# Patient Record
Sex: MALE | Race: WHITE | NOT HISPANIC OR LATINO | Employment: FULL TIME | URBAN - METROPOLITAN AREA
[De-identification: names, ages, dates, MRNs, and addresses within clinical notes are randomized per-mention and may not be internally consistent; named-entity substitution may affect disease eponyms.]

---

## 2019-05-18 ENCOUNTER — DOCUMENTATION (OUTPATIENT)
Dept: NEUROLOGY | Facility: CLINIC | Age: 64
End: 2019-05-18

## 2019-05-22 ENCOUNTER — TELEPHONE (OUTPATIENT)
Dept: NEUROLOGY | Facility: CLINIC | Age: 64
End: 2019-05-22

## 2019-06-12 ENCOUNTER — TELEPHONE (OUTPATIENT)
Dept: NEUROLOGY | Facility: CLINIC | Age: 64
End: 2019-06-12

## 2019-06-13 ENCOUNTER — TELEPHONE (OUTPATIENT)
Dept: NEUROLOGY | Facility: CLINIC | Age: 64
End: 2019-06-13

## 2019-06-13 ENCOUNTER — OFFICE VISIT (OUTPATIENT)
Dept: NEUROLOGY | Facility: CLINIC | Age: 64
End: 2019-06-13
Payer: COMMERCIAL

## 2019-06-13 VITALS
HEIGHT: 73 IN | DIASTOLIC BLOOD PRESSURE: 76 MMHG | HEART RATE: 63 BPM | SYSTOLIC BLOOD PRESSURE: 120 MMHG | WEIGHT: 272.1 LBS | BODY MASS INDEX: 36.06 KG/M2

## 2019-06-13 DIAGNOSIS — Z99.89 OSA ON CPAP: Primary | ICD-10-CM

## 2019-06-13 DIAGNOSIS — Z86.73 HISTORY OF STROKE: ICD-10-CM

## 2019-06-13 DIAGNOSIS — F48.2 PSEUDOBULBAR AFFECT: ICD-10-CM

## 2019-06-13 DIAGNOSIS — E78.5 HYPERLIPIDEMIA, UNSPECIFIED HYPERLIPIDEMIA TYPE: ICD-10-CM

## 2019-06-13 DIAGNOSIS — G47.33 OSA ON CPAP: Primary | ICD-10-CM

## 2019-06-13 PROBLEM — R06.09 DYSPNEA ON EXERTION: Status: ACTIVE | Noted: 2019-02-25

## 2019-06-13 PROBLEM — I25.10 ATHEROSCLEROSIS OF NATIVE CORONARY ARTERY OF NATIVE HEART WITHOUT ANGINA PECTORIS: Status: ACTIVE | Noted: 2019-02-25

## 2019-06-13 PROBLEM — C61 PROSTATE CANCER (HCC): Status: ACTIVE | Noted: 2017-08-28

## 2019-06-13 PROBLEM — R06.00 DYSPNEA ON EXERTION: Status: ACTIVE | Noted: 2019-02-25

## 2019-06-13 PROCEDURE — 99215 OFFICE O/P EST HI 40 MIN: CPT | Performed by: PSYCHIATRY & NEUROLOGY

## 2019-06-13 RX ORDER — ISOSORBIDE DINITRATE 30 MG/1
30 TABLET ORAL DAILY
COMMUNITY

## 2019-06-13 RX ORDER — PNV NO.95/FERROUS FUM/FOLIC AC 28MG-0.8MG
2 TABLET ORAL DAILY
Status: ON HOLD | COMMUNITY
End: 2021-02-10 | Stop reason: ALTCHOICE

## 2019-06-13 RX ORDER — METOPROLOL SUCCINATE 100 MG/1
100 TABLET, EXTENDED RELEASE ORAL DAILY
COMMUNITY

## 2019-06-13 RX ORDER — FLUTICASONE FUROATE, UMECLIDINIUM BROMIDE AND VILANTEROL TRIFENATATE 100; 62.5; 25 UG/1; UG/1; UG/1
1 POWDER RESPIRATORY (INHALATION) DAILY
Refills: 11 | COMMUNITY
Start: 2019-05-05

## 2019-06-13 RX ORDER — GABAPENTIN 300 MG/1
300 CAPSULE ORAL
Refills: 2 | COMMUNITY
Start: 2019-06-10 | End: 2019-08-09

## 2019-06-13 RX ORDER — LISINOPRIL 20 MG/1
20 TABLET ORAL
Refills: 3 | COMMUNITY
Start: 2019-05-14

## 2019-06-13 RX ORDER — MONTELUKAST SODIUM 4 MG/1
1 TABLET, CHEWABLE ORAL 2 TIMES DAILY
COMMUNITY

## 2019-06-13 RX ORDER — TICAGRELOR 60 MG/1
60 TABLET ORAL 2 TIMES DAILY
Refills: 3 | COMMUNITY
Start: 2019-05-20 | End: 2019-07-17

## 2019-06-13 RX ORDER — OMEPRAZOLE 40 MG/1
40 CAPSULE, DELAYED RELEASE ORAL DAILY
COMMUNITY
Start: 2018-07-11

## 2019-06-13 RX ORDER — EZETIMIBE 10 MG/1
10 TABLET ORAL DAILY
COMMUNITY

## 2019-07-10 ENCOUNTER — TELEPHONE (OUTPATIENT)
Dept: NEUROLOGY | Facility: CLINIC | Age: 64
End: 2019-07-10

## 2019-07-10 NOTE — TELEPHONE ENCOUNTER
Fax received from patient of Long-Term Disability forms  Current STD runs out July 31, 2019  Per patient, previous STD forms stated patient "is unable to return to work from a neurological standpoint"  Patient has been out of work since January 29, 2019  Please let us know if you would be agreeable to completing Long term forms  If agreeable we will contact patient for payment  Reviewed with patient fee for forms and 2 week turnaround  Blank forms to be scanned under media

## 2019-07-11 NOTE — TELEPHONE ENCOUNTER
Yes, the plan would be to re-valuate this at the time of his next follow up  If he feels ready to return, however, I am not opposed to it  Please will you remind me of what kind of work he does and what symptoms he is now currently experiencing that are preventing him from returning

## 2019-07-12 ENCOUNTER — TELEPHONE (OUTPATIENT)
Dept: NEUROLOGY | Facility: CLINIC | Age: 64
End: 2019-07-12

## 2019-07-12 NOTE — TELEPHONE ENCOUNTER
Kait Adler from [facility] states the patient is complaining of side effects from Brilinta including increased heart rate, heart pounding, and headache  Dr Rosy Espinoza would like to switch the patient to plavix if Dr Dhara Valladares is agreeable to this  Please advise  If agreeable, Kait Adler is requesting a call back as well as documentation  They will accept encounter printed and faxed       F: [phone]  P: [phone] ext [phone]

## 2019-07-12 NOTE — TELEPHONE ENCOUNTER
Patient's wife calling in regarding below  She states the patient is not feeling well and they are not sure what to do  She reports the patient has SOB, rapid heart beat, chest tightness, and a severe headache  She states she is concerned about him having another stroke  Asking if this is a medication side effect or if it could be something else  Advised her to take the patient to the ED to be assessed  Patient's wife will take the patient to [facility]  Please advise on medications below  Thanks

## 2019-07-16 NOTE — TELEPHONE ENCOUNTER
Patient was admitted to hospital over the weekend, see note from [address] dated 7/12  He states he is feeling a lot better, his BP and HR are normal again  Patient was put back on plavix and is continuing to take aspirin  They determined Jeraline Porch was causing his symptoms  They also discontinued his prilosec as it interacted with plavix and he was started on protonix   Lisinopril dose was lowered to 2 5mg daily because he was hypotensive in the hospital

## 2019-07-16 NOTE — TELEPHONE ENCOUNTER
Called patient gave form completion payment fee of $25 over the phone  patient is aware of the 14 day turn around period he also states he needs forms completed by 7/31/19  Also on page 12 of the 3 pages  The provider will have to review that page which those are Functional  Capacity questions  Patient request that forms be faxed to [phone]

## 2019-07-16 NOTE — TELEPHONE ENCOUNTER
I do agree with evaluating him in the ER  If there is no other cause found for his symptoms and it is felt to be related to [place] we can go back to [place] + Plavix for now

## 2019-07-16 NOTE — TELEPHONE ENCOUNTER
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

## 2019-07-17 DIAGNOSIS — Z86.73 HISTORY OF STROKE: Primary | ICD-10-CM

## 2019-07-17 RX ORDER — CLOPIDOGREL BISULFATE 75 MG/1
75 TABLET ORAL DAILY
Qty: 30 TABLET | Refills: 0
Start: 2019-07-17

## 2019-07-17 NOTE — TELEPHONE ENCOUNTER
Noted   Suggest that we check P2Y12 level in 2 weeks to ensure that he is a responder to this medication  Please help him to schedule this (it has to be scheduled or drawn at [facility] I think   )  This will help to ensure that he is appropriately responding to the Plavix  If he is not responding well to the Plavix (if it is ineffective) then we may need to consider an alternative    Rx entered

## 2019-07-17 NOTE — TELEPHONE ENCOUNTER
Also, Please will you check with him to see if he needs a new rx for the Plavix or if he has a reasonable supply from the hospital?  (I entered a 30 day rx but did not sent it to the pharmacy   )

## 2019-07-18 NOTE — TELEPHONE ENCOUNTER
Called patient, read him Dr Amie Saldivar message  He verbalizes understanding on all information  States he is located near [facility] and will be going there to have his labs drawn  Requested paper script be mailed to him  Script printed and mailed to patient  He will call with info after drawn in 2 weeks  Patient states he has 3 refills of his Plavix at a 30 day supply each fill  Patient does not have any additional questions at this time

## 2019-07-22 NOTE — TELEPHONE ENCOUNTER
Form completed and emailed to you  Please sign,  Office note will need to be faxed along with signed form

## 2019-07-23 NOTE — TELEPHONE ENCOUNTER
Signed form received from dr Corrinne Parson  Form and last office noted faxed to [phone]  Copy of form mailed to pt      Completed form given to  to scan into chart

## 2019-07-25 NOTE — TELEPHONE ENCOUNTER
Jozef charles/ [facility] stated Dr Nickolas Massey is requesting something in writing that Dr Massiel Mcgrath is agreeable to switching to plavix    Fax to: [phone]

## 2019-07-25 NOTE — TELEPHONE ENCOUNTER
Received another call from [facility], she stated she just learned that the patient will no longer be coming to their office  Patient will now be seeing [facility]  Letter no longer needed from Dr Duglas Caballero regarding plavix

## 2019-08-09 DIAGNOSIS — Z86.73 HISTORY OF STROKE: Primary | ICD-10-CM

## 2019-08-09 RX ORDER — GABAPENTIN 600 MG/1
600 TABLET ORAL
Qty: 30 TABLET | Refills: 3 | Status: SHIPPED | OUTPATIENT
Start: 2019-08-09 | End: 2019-12-06 | Stop reason: SDUPTHER

## 2019-08-09 RX ORDER — GABAPENTIN 300 MG/1
300 CAPSULE ORAL
Qty: 90 CAPSULE | Refills: 0 | OUTPATIENT
Start: 2019-08-09 | End: 2019-11-07

## 2019-08-09 NOTE — TELEPHONE ENCOUNTER
Please give him a call  I entered a new rx for him for GBP 600mg to be taken at night  I started with a 30 day supply but if he does well at this dose and wants to continue I can re-enter for a 90 day supply  He should let us know

## 2019-08-09 NOTE — TELEPHONE ENCOUNTER
Medication refill check list    Correct patient? yes   Correct medication name, dose, and pill size? yes   Correct provider? yes   Last and Next appt  scheduled? Yes, last date 06/13/19 & next date 09/27/19   Right pharmacy listed? yes   Correct quantity for 30 or 90 days? yes   Is the patient out of refills? When was it last prescribed? Yes, date 06/10/19   Directions match what the patient says they are taking? yes   Enough refills? (none for controlled substances, 1 year for routine medications) yes       Patient would like an increase on the strength  He is still having trouble sleeping at night

## 2019-09-10 ENCOUNTER — TELEPHONE (OUTPATIENT)
Dept: NEUROLOGY | Facility: CLINIC | Age: 64
End: 2019-09-10

## 2019-09-10 NOTE — TELEPHONE ENCOUNTER
Per Governor Ashley @ [place] called back and stated they do not do this test and would advise patient to use [facility]

## 2019-09-17 NOTE — TELEPHONE ENCOUNTER
This is a specialized test  It is not done by [facility] or [address] that I know of  We do it at [facility] and [facility] does it at [facility]   The labs cannot be drawn elsewhere and shipped to the location to run it as it throws off the result  If he cannot have it drawn, then we will have to do our best for him without it

## 2019-09-27 ENCOUNTER — OFFICE VISIT (OUTPATIENT)
Dept: NEUROLOGY | Facility: CLINIC | Age: 64
End: 2019-09-27
Payer: COMMERCIAL

## 2019-09-27 VITALS
HEIGHT: 73 IN | SYSTOLIC BLOOD PRESSURE: 130 MMHG | HEART RATE: 54 BPM | BODY MASS INDEX: 36.82 KG/M2 | WEIGHT: 277.8 LBS | DIASTOLIC BLOOD PRESSURE: 74 MMHG

## 2019-09-27 DIAGNOSIS — G47.09 OTHER INSOMNIA: ICD-10-CM

## 2019-09-27 DIAGNOSIS — Z86.73 HISTORY OF STROKE: Primary | ICD-10-CM

## 2019-09-27 DIAGNOSIS — G47.33 OSA ON CPAP: ICD-10-CM

## 2019-09-27 DIAGNOSIS — Z99.89 OSA ON CPAP: ICD-10-CM

## 2019-09-27 DIAGNOSIS — F48.2 PSEUDOBULBAR AFFECT: ICD-10-CM

## 2019-09-27 DIAGNOSIS — I65.23 BILATERAL CAROTID ARTERY STENOSIS: ICD-10-CM

## 2019-09-27 DIAGNOSIS — E78.5 HYPERLIPIDEMIA, UNSPECIFIED HYPERLIPIDEMIA TYPE: ICD-10-CM

## 2019-09-27 DIAGNOSIS — I69.391 DYSPHAGIA AS LATE EFFECT OF CEREBROVASCULAR ACCIDENT (CVA): ICD-10-CM

## 2019-09-27 PROCEDURE — 99214 OFFICE O/P EST MOD 30 MIN: CPT | Performed by: PSYCHIATRY & NEUROLOGY

## 2019-09-27 RX ORDER — NITROGLYCERIN 0.4 MG/1
TABLET SUBLINGUAL AS NEEDED
Refills: 0 | Status: ON HOLD | COMMUNITY
Start: 2019-07-13 | End: 2021-02-10 | Stop reason: ALTCHOICE

## 2019-09-27 RX ORDER — TRAZODONE HYDROCHLORIDE 50 MG/1
TABLET ORAL
Qty: 30 TABLET | Refills: 5 | Status: SHIPPED | OUTPATIENT
Start: 2019-09-27 | End: 2020-03-13

## 2019-09-27 RX ORDER — PHENOL 1.4 %
2 AEROSOL, SPRAY (ML) MUCOUS MEMBRANE
COMMUNITY

## 2019-09-27 RX ORDER — PANTOPRAZOLE SODIUM 40 MG/1
40 TABLET, DELAYED RELEASE ORAL DAILY
COMMUNITY

## 2019-09-27 RX ORDER — METOPROLOL SUCCINATE 25 MG/1
25 TABLET, EXTENDED RELEASE ORAL DAILY
COMMUNITY

## 2019-09-27 NOTE — PATIENT INSTRUCTIONS
Stroke: Giacomo Varela presents for a follow-up evaluation with regard to his prior stroke  In the interval since his last visit to the office they report no new symptoms concerning for recurrent TIA or stroke  He is getting some headaches from time to time, and still has some residual word-finding issues but he is no longer having outbursts of emotion that are not congruent with his mood  He notes that he is having a little bit of emotional lability from time to time which is not unexpected  He has recently had a test for pulmonary hypertension which apparently looked quite good, and is pending a transesophageal echocardiogram   He has an implanted loop monitor in place and we have no report of any atrial fibrillation up to this point in time  He is taking his medication as prescribed and denies any significant bleeding or bruising issues  He continues to experience significant insomnia and is currently experiencing dysphagia as a late effect from his stroke  - for ongoing stroke prevention he should continue his combination of anti-platelet medications, statin, repatha, and appropriate blood pressure and glycemic control  -  I would suggest that he does not need dual anti-platelet therapy long-term for stroke prevention  His recent P2Y12 test done at [facility] confirms that he is responding to Plavix  At the discretion of the cardiologist he could discontinue either aspirin or Plavix  -I would like him to be physically active in as much as he feels capable of doing so  -I will defer to his primary care/ cardiology team for monitoring of his cholesterol panel and blood sugar numbers  Should target a blood pressure of less than 130/80 on a regular basis  - we will look back at the original records from [facility] to ensure that there is no evidence whatsoever of carotid artery stenosis that would require ongoing monitoring  Our office will give him a call  In this regard      - he did not drive any significant benefit from the increase in gabapentin  At this point we will maintain gabapentin and begin trazodone at a dose of 25 mg at night for 3 days  If he notes no benefit at 25 mg he will increase to 50 mg   I would like him to contact our office in no more than 10-14 days to report on his progress  If he is doing well on the trazodone then we will wean the gabapentin off   -with regard to his dysphagia, he reports that it is more significant to solids than to liquids  He has not had any pneumonia  We will ask for a barium swallow to be performed  I will plan for him to return to the office to see us in 8 months time but I would be happy to see him sooner if the need should arise  If he has any symptoms concerning for TIA or stroke such as sudden painless loss of vision or double vision, difficulty speaking or swallowing, vertigo/ room spinning that does not quickly resolve, or weakness/ numbness affecting 1 side of the face or body he should proceed by ambulance to the nearest emergency room immediately

## 2019-09-27 NOTE — PROGRESS NOTES
Patient ID: Jarrod Blanca is a 60 y o  male  Assessment/Plan:    No problem-specific Assessment & Plan notes found for this encounter  Diagnoses and all orders for this visit:    History of stroke  -     VAS carotid complete study; Future    YOLA on CPAP    Hyperlipidemia, unspecified hyperlipidemia type    Pseudobulbar affect    Dysphagia as late effect of cerebrovascular accident (CVA)  -     FL barium swallow video w speech; Future    Other insomnia  -     traZODone (DESYREL) 50 mg tablet; 1/2 tab HS X 3 days, if no significant benefit increase to 1 tab HS    Bilateral carotid artery stenosis  -     VAS carotid complete study; Future    Other orders  -     metoprolol succinate (TOPROL-XL) 25 mg 24 hr tablet; Take 25 mg by mouth daily  -     pantoprazole (PROTONIX) 40 mg tablet; Take 40 mg by mouth daily  -     Melatonin 10 MG TABS; Take 2 tablets by mouth daily at bedtime  -     nitroglycerin (NITROSTAT) 0 4 mg SL tablet; as needed       Patient Instructions   Stroke: Giacomo Varela presents for a follow-up evaluation with regard to his prior stroke  In the interval since his last visit to the office they report no new symptoms concerning for recurrent TIA or stroke  He is getting some headaches from time to time, and still has some residual word-finding issues but he is no longer having outbursts of emotion that are not congruent with his mood  He notes that he is having a little bit of emotional lability from time to time which is not unexpected  He has recently had a test for pulmonary hypertension which apparently looked quite good, and is pending a transesophageal echocardiogram   He has an implanted loop monitor in place and we have no report of any atrial fibrillation up to this point in time  He is taking his medication as prescribed and denies any significant bleeding or bruising issues    He continues to experience significant insomnia and is currently experiencing dysphagia as a late effect from his stroke  - for ongoing stroke prevention he should continue his combination of anti-platelet medications, statin, repatha, and appropriate blood pressure and glycemic control  -  I would suggest that he does not need dual anti-platelet therapy long-term for stroke prevention  His recent P2Y12 test done at [facility] confirms that he is responding to Plavix  At the discretion of the cardiologist he could discontinue either aspirin or Plavix  -I would like him to be physically active in as much as he feels capable of doing so  -I will defer to his primary care/ cardiology team for monitoring of his cholesterol panel and blood sugar numbers  Should target a blood pressure of less than 130/80 on a regular basis  - we will look back at the original records from [facility] to ensure that there is no evidence whatsoever of carotid artery stenosis that would require ongoing monitoring  Our office will give him a call  In this regard  - he did not drive any significant benefit from the increase in gabapentin  At this point we will maintain gabapentin and begin trazodone at a dose of 25 mg at night for 3 days  If he notes no benefit at 25 mg he will increase to 50 mg   I would like him to contact our office in no more than 10-14 days to report on his progress  If he is doing well on the trazodone then we will wean the gabapentin off   -with regard to his dysphagia, he reports that it is more significant to solids than to liquids  He has not had any pneumonia  We will ask for a barium swallow to be performed  I will plan for him to return to the office to see us in 8 months time but I would be happy to see him sooner if the need should arise    If he has any symptoms concerning for TIA or stroke such as sudden painless loss of vision or double vision, difficulty speaking or swallowing, vertigo/ room spinning that does not quickly resolve, or weakness/ numbness affecting 1 side of the face or body he should proceed by ambulance to the nearest emergency room immediately  Subjective:      HPI:  Sarah Burrell presents for follow-up with regard to his prior stroke  In the interval since his last visit the office he reports no new symptoms concerning for recurrent TIA or stroke  He takes his medications as prescribed  He did not endorse any severe bleeding or bruising issues  He notes that he previously had been taken off of the colestipol but had a re-emergence of significant diarrhea with bowel incontinence, and is now back on it and doing much better  He also reports that the repatha  Has significantly improved his cholesterol numbers  He continues to have issues with insomnia  The increase in gabapentin from 300 mg 600 mg did not provide any additional benefit  He is not experiencing any side effects  He denies ongoing issues with pseudobulbar affect but reports that he is somewhat emotionally labile and make prior get emotional more easily  He does have obstructive sleep apnea and continues to use his CPAP machine  He is currently pending a transesophageal echocardiogram, and is working with his cardiologist with regard to monitoring for atrial fibrillation  Addendum:  Subsequent to his visit in the office I was able to look back in the records at the CT angiography that was performed at [facility]   There was evidence of bilateral relatively mild carotid artery stenosis  This was in May of 2019  Considering the plaque in his carotid arteries I do think that this should be repeated in approximately 3 months time  Prescription was placed in the system and the patient and his wife were given the information prior to their departure          Past Medical History:   Diagnosis Date    Arthritis     COPD [facility]chronic obstructive [facility] disease) ([facility]     GERD (gastroesophageal reflux disease)     Heart attack ([facility]     Hypertension     Lung disease     [facility] ([facility]     Sleep apnea     Stroke ([facility] )     TIA (transient ischemic attack)        Social History     Socioeconomic History    Marital status: /Civil Union     Spouse name: None    Number of children: None    Years of education: None    Highest education level: None   Occupational History    None   Social Needs    Financial resource strain: None    Food insecurity:     Worry: None     Inability: None    Transportation needs:     Medical: None     Non-medical: None   Tobacco Use    Smoking status: Former Smoker     Last attempt to quit: 2018     Years since quittin 3    Smokeless tobacco: Never Used   Substance and Sexual Activity    Alcohol use: Not Currently     Comment: rarely    Drug use: Never    Sexual activity: None   Lifestyle    Physical activity:     Days per week: None     Minutes per session: None    Stress: None   Relationships    Social connections:     Talks on phone: None     Gets together: None     Attends Yazdanism service: None     Active member of club or organization: None     Attends meetings of clubs or organizations: None     Relationship status: None    Intimate partner violence:     Fear of current or ex partner: None     Emotionally abused: None     Physically abused: None     Forced sexual activity: None   Other Topics Concern    None   Social History Narrative    None       Family History   Problem Relation Age of Onset    Stroke Mother     Atrial fibrillation Mother     No Known Problems Father     Diabetes Maternal Grandmother     Heart disease Maternal Grandmother     Heart disease Maternal Grandfather          Current Outpatient Medications:     aspirin 81 MG tablet, Take 81 mg by mouth daily, Disp: , Rfl:     clopidogrel (PLAVIX) 75 mg tablet, Take 1 tablet (75 mg total) by mouth daily, Disp: 30 tablet, Rfl: 0    colestipol (COLESTID) 1 g tablet, Take 1 tablet by mouth 2 (two) times a day, Disp: , Rfl:     Evolocumab (Adrianna Byers [place]), Inject under the skin Once every 2 weeks, Disp: , Rfl:     ezetimibe (ZETIA) 10 mg tablet, Take 10 mg by mouth daily, Disp: , Rfl:     gabapentin (NEURONTIN) 600 MG tablet, Take 1 tablet (600 mg total) by mouth daily at bedtime, Disp: 30 tablet, Rfl: 3    isosorbide dinitrate (ISORDIL) 30 mg tablet, Take 30 mg by mouth daily, Disp: , Rfl:     lisinopril (ZESTRIL) 20 mg tablet, Take 20 mg by mouth daily at bedtime, Disp: , Rfl: 3    Melatonin 10 MG TABS, Take 2 tablets by mouth daily at bedtime, Disp: , Rfl:     metoprolol succinate (TOPROL XL) 100 mg 24 hr tablet, Take 100 mg by mouth daily, Disp: , Rfl:     metoprolol succinate (TOPROL-XL) 25 mg 24 hr tablet, Take 25 mg by mouth daily, Disp: , Rfl:     Omega-3 Fatty Acids (FISH OIL OMEGA-3) 1000 MG CAPS, Take 2 g by mouth daily, Disp: , Rfl:     pantoprazole (PROTONIX) 40 mg tablet, Take 40 mg by mouth daily, Disp: , Rfl:     TRELEGY ELLIPTA 100-62 5-25 MCG/INH inhaler, 1 puff daily, Disp: , Rfl: 11    nitroglycerin (NITROSTAT) 0 4 mg SL tablet, as needed, Disp: , Rfl: 0    omeprazole (PriLOSEC) 40 MG capsule, Take 40 mg by mouth daily, Disp: , Rfl:           The following portions of the patient's history were reviewed: allergies, current medications, past family history, past medical history, past social history and problem list            Objective:    Blood pressure 130/74, pulse (!) 54, height 6' 1" (1 854 m), weight 126 kg (277 lb 12 8 oz)  Physical Exam    Neurological Exam      At the time of my evaluation he was awake, alert, and in no distress  There were no obvious cranial neuropathies or lateralizing sides  There was no dysarthria or aphasia  His gait was stable with no clear ataxia  ROS:    Review of Systems   Constitutional: Negative  Negative for appetite change and fever  HENT: Positive for trouble swallowing  Negative for hearing loss, tinnitus and voice change           Sinus problems   Eyes: Negative  Negative for photophobia and pain  Respiratory: Positive for cough and shortness of breath  Cardiovascular: Negative  Negative for palpitations  Gastrointestinal: Negative  Negative for nausea and vomiting  Loss of bowel control   Endocrine: Negative  Negative for cold intolerance and heat intolerance  Erection difficulties  Loss of sexual drive   Genitourinary: Negative  Negative for dysuria, frequency and urgency  Musculoskeletal: Positive for gait problem (pain while walking)  Negative for myalgias and neck pain  Skin: Negative  Negative for rash  Neurological: Positive for headaches  Negative for dizziness, tremors, seizures, syncope, facial asymmetry, speech difficulty, weakness, light-headedness and numbness  Cramping   Hematological: Negative  Does not bruise/bleed easily  Psychiatric/Behavioral: Positive for sleep disturbance (trouble falling asleep, waking up at night)  Negative for confusion and hallucinations  Reviewed ROS as entered by medical assistant

## 2019-10-23 ENCOUNTER — HOSPITAL ENCOUNTER (OUTPATIENT)
Dept: RADIOLOGY | Facility: HOSPITAL | Age: 64
Discharge: HOME | End: 2019-10-23
Attending: PSYCHIATRY & NEUROLOGY
Payer: COMMERCIAL

## 2019-10-23 DIAGNOSIS — I69.391 DYSPHAGIA AS LATE EFFECT OF CEREBROVASCULAR ACCIDENT (CVA): ICD-10-CM

## 2019-10-23 PROCEDURE — 92611 MOTION FLUOROSCOPY/SWALLOW: CPT

## 2019-10-23 PROCEDURE — G8997 SWALLOW GOAL STATUS: HCPCS

## 2019-10-23 PROCEDURE — 74230 X-RAY XM SWLNG FUNCJ C+: CPT

## 2019-10-23 PROCEDURE — G8996 SWALLOW CURRENT STATUS: HCPCS

## 2019-10-23 PROCEDURE — G8998 SWALLOW D/C STATUS: HCPCS

## 2019-10-23 NOTE — PROCEDURES
Video Swallow Study      Patient Name: Lottie Krishna  OHMJG'M Date: 10/23/2019        Past Medical History  Past Medical History:   Diagnosis Date    Arthritis     [facility]     GERD (gastroesophageal reflux disease)     [facility] attack ([facility] )     Hypertension     Lung disease     Prostate cancer ([facility] )     Pseudobulbar affect 6/13/2019    Sleep apnea     Stroke ([facility] )     TIA (transient ischemic attack)         Past Surgical History  Past Surgical History:   Procedure Laterality Date    CAROTID STENT      CHOLECYSTECTOMY      HERNIA REPAIR         Video Barium Swallow Study    Summary:  Pt presents with oral phase of the swallow that is WNL, and minimal impairment of the pharyngeal phase  This is characterized by mildly reduced bolus control and premature spill of thin liquid into the pyriform sinuses with consecutive sips by cup and straw, and reduced hyolaryngeal elevation, which does not appear to impact the swallow adversely  There was no aspiration/penetration observed  Pt is appropriate to continue current regular diet/thin liquids with recommendations below  Images are on PACS for review  Recommendations:  Diet: Regular diet  Liquids: Thin liquids, cup or straw  Meds: Whole with water  Strategies: Eat slowly, small bites, single sips, alternate solids and liquids if pt feels solid food is "stuck"  Upright position  F/u ST tx: No  Therapy Prognosis: Excellent  Prognosis considerations: Age, cognitive status  Independent with meals  Results reviewed with: pt, sent to physician through [facility]  Recommendations were discussed with patient; he verbalized understanding  If a dedicated assessment of the esophagus is desired, consider esophagram/barium swallow or EGD  Patient's goal:  Wants to make sure his swallow is normal, wants to know why he feels like his throat "fills up" when drinking liquids quickly  Goals:  Pt will tolerate least restrictive diet w/out s/s aspiration or oral/pharyngeal difficulties  Pt is a 59 y/o male seen today for OP VBS  Pt has a hx of TIA and "mild CVA", per patient  Pt c/o not being able to "guzzle" or drink thin liquids quickly, states that the throat "feels like it fills up"  He also reports intermittent solid dysphagia, feeling like food "gets stuck", pointing to throat  He does not identify any particular foods that cause this  Pt expresses some anxiety that his mother  of esophageal CA, and is hoping that his esophagus is normal  Explained to patient while we do a brief scan of the esophagus during the VBS, for full assessment of the esophagus an esophagram and/or EGD would be necessary  He verbalized understanding  PMH:  See above, includes TIA/CVA    Previous VBS:  No    Current Diet:   Regular, thin liquids    Premorbid diet:  Same    Dentition:    O2 requirement:  Room air    Oral mech:  Strength and ROM: Grossly intact, no droop or weakness    Vocal Quality/Speech:  WNL, no dysarthria/apraxia, vocal quality is strong, WNL    Cognitive status:  Appears intact during conversation, able to follow all commands and answer questions appropriately  Consistencies administered: Barium laden applesauce, banana, bagel, hard cookie, thin liquids, 13mm barium pill  Liquids were administered by cup and straw  Pt was seated laterally at 90 degrees  Oral stage:  WNL  Lip closure: WNL  Mastication: efficient, rotary motion  Bolus formation: cohesive, prompt  Bolus control: good overall, somewhat reduced with thin liquids  Transfer: Prompt  Residue: Minimal to none, clears with second swallow which patient initiates  Pharyngeal stage:  Minimal impairment  Swallow promptness: Mostly prompt  Spill to valleculae: Thin liquids  Spill to pyriforms:   Thing liquids, with consecutive sips; liquid spilled to pyriforms and filled the entire pyriform space prior to the UES opening up; with single sips this did not occur  Epiglottic inversion: WNL  Laryngeal excursion: Reduced elevation and anterior motion  Pharyngeal constriction: WNL  Vallecular retention: No  Pyriform retention: No  PPW coating: No  Osteophytes: No  CP prominence: No  Retropulsion from prominence: N/A  Transient penetration: No  Epiglottic undercoat: No  Penetration: No  Aspiration: No  Strategies: Single sips reduced premature spill into pharynx, and resulted in better bolus control and more prompt swallows of thin liquid  Response to aspiration: N/A    Screening of Esophageal stage:  A brief scan of the esophagus did not reveal any obvious abnormality in bolus clearance  If further assessment of the esophagus is desired, recommend esophagram or EGD

## 2019-10-25 ENCOUNTER — TELEPHONE (OUTPATIENT)
Dept: NEUROLOGY | Facility: CLINIC | Age: 64
End: 2019-10-25

## 2019-10-25 NOTE — PROGRESS NOTES
Please call Colin Brothers and let him know the results of the video barium swallow and their recommendations below  Thanks!       Summary:  Pt presents with oral phase of the swallow that is WNL, and minimal impairment of the pharyngeal phase  This is characterized by mildly reduced bolus control and premature spill of thin liquid into the pyriform sinuses with consecutive sips by cup and straw, and reduced hyolaryngeal elevation, which does not appear to impact the swallow adversely  There was no aspiration/penetration observed  Pt is appropriate to continue current regular diet/thin liquids with recommendations below  Images are on PACS for review             Recommendations:  Diet: Regular diet  Liquids: Thin liquids, cup or straw  Meds: Whole with water  Strategies: Eat slowly, small bites, single sips, alternate solids and liquids if pt feels solid food is "stuck"  Upright position  F/u ST tx: No  Therapy Prognosis: Excellent  Prognosis considerations: Age, cognitive status  Independent with meals  Results reviewed with: pt, sent [facility] physician [facility]  Recommendations were discussed with patient; he verbalized understanding  If a dedicated assessment of the esophagus is desired, consider esophagram/barium swallow or EGD

## 2019-10-25 NOTE — TELEPHONE ENCOUNTER
Spoke to patient's wife (patient just stepped out)  She stated someone did go over the results with him and provided recommendations  I also reviewed recommendations with wife  She verbalized understanding  She requested a copy of the swallow study be faxed to PCP  Faxed to Dr Randolph Briones office @ [phone]        Filed: 10/25/2019  8:51 AM Date of Service: 10/23/2019 11:59 PM Status: Signed  : Sonia Mercer MD (Physician)    Added by:  Sonia Mercer MD      Please call Carola Mckoy and let him know the results of the video barium swallow and their recommendations below    Thanks!

## 2019-12-06 DIAGNOSIS — Z86.73 HISTORY OF STROKE: ICD-10-CM

## 2019-12-06 RX ORDER — GABAPENTIN 600 MG/1
TABLET ORAL
Qty: 30 TABLET | Refills: 3 | Status: SHIPPED | OUTPATIENT
Start: 2019-12-06 | End: 2020-03-20

## 2020-02-24 ENCOUNTER — TELEPHONE (OUTPATIENT)
Dept: NEUROLOGY | Facility: CLINIC | Age: 65
End: 2020-02-24

## 2020-02-24 NOTE — TELEPHONE ENCOUNTER
Patient's wife dropped off FMLA form to be updated  Dropped charges and collected payment of $15 for form completion  Form scanned into chart  Please fax completed form to [employer] at [phone]  Patient would also like a copy mailed to home address on file

## 2020-03-02 NOTE — TELEPHONE ENCOUNTER
Forms partially completed  Emailed forms to [place] for Dr Vinod Sumner to complete and sign  Once signed, pls fax to [phone] and scan into chart          thanks

## 2020-03-04 NOTE — TELEPHONE ENCOUNTER
Forms emailed to Dr Hyacinth Rinne  Pls complete/review and sign  Pls email it back to me        thanks

## 2020-03-05 NOTE — TELEPHONE ENCOUNTER
Forms were emailed to Dr Carl Perez at the end of the day yesterday  Awaiting completed forms from Dr Carl Perez

## 2020-03-06 NOTE — TELEPHONE ENCOUNTER
Received forms from Dr Good Zarate, faxed to number provided  Scanned into patient's chart  Mailing the patient a copy as requested

## 2020-03-12 DIAGNOSIS — G47.09 OTHER INSOMNIA: ICD-10-CM

## 2020-03-12 NOTE — TELEPHONE ENCOUNTER
I got this refill request for [facility]  We need to know how he is doing on the medication and if he is taking 1/2 tablet or 1 full tablet  Is it effective for him? I can then process the refill with the correct strength and the number of tablets

## 2020-03-13 ENCOUNTER — TELEPHONE (OUTPATIENT)
Dept: NEUROLOGY | Facility: CLINIC | Age: 65
End: 2020-03-13

## 2020-03-13 RX ORDER — TRAZODONE HYDROCHLORIDE 50 MG/1
TABLET ORAL
Qty: 45 TABLET | Refills: 1 | Status: SHIPPED | OUTPATIENT
Start: 2020-03-13 | End: 2020-05-12

## 2020-03-13 NOTE — TELEPHONE ENCOUNTER
----- Message from Too Johnson MD sent at 3/13/2020 11:45 AM EDT -----  Regarding: rx    Please see below    At this point, the dose is still reasonably low and provided he is not having any side effects ( adverse effects on mood, low blood pressure/dizziness, suicidal thoughts /confusion    Not that I expect him to have any thing like that, but just want to double-check), he can take either 1 full tablet or 1 5 tablets for the next 2 weeks  If he is having night that he can tell are going to be difficult he can certainly take the 1 5 tablets  If he finds this strategy to be effective that is excellent but if not he can to 1 5 tablets every night  If there are no side effects whatsoever and he is still having some difficult nights he can take the dose up to 2 tablets when he feels that is necessary beginning 2 weeks from now  I would like for him to contact us in no more than 3-4 weeks to report on his progress and so that we can send the proper refills down the road    "Luis BOUDREAUX routed conversation to You 3 hours ago (8:32 AM)    JONATAN BOUDREAUX 3 hours ago (8:32 AM)          As per pt he is taking the 1 full tablet  He also advises that sometimes it work and sometimes it doesn't  He stated last night he was not able to sleep  Pt also advises that if dose can be increased it might help  Documentation     Luis BOUDREAUX 3 hours ago (8:27 AM)    Rico Weldon RN routed conversation to [facility] [place] [facility] 18 hours ago (5:28 PM)    [facility]  Neurology [address] (11:21 AM)          I got this refill request for [facility]  We need to know how he is doing on the medication and if he is taking 1/2 tablet or 1 full tablet  Is it effective for him? I can then process the refill with the correct strength and the number of tablets        Documentation     Interface, [facility] routed conversation to You Yesterday (8:00 AM)"

## 2020-03-13 NOTE — TELEPHONE ENCOUNTER
Informed patient of instructions below  Verbalized understanding  Will update office in a few weeks on progress  Currently taking 1 tablet and having no side effects

## 2020-03-13 NOTE — TELEPHONE ENCOUNTER
As per pt he is taking the 1 full tablet  He also advises that sometimes it work and sometimes it doesn't  He stated last night he was not able to sleep  Pt also advises that if dose can be increased it might help

## 2020-03-16 ENCOUNTER — TELEPHONE (OUTPATIENT)
Dept: NEUROLOGY | Facility: CLINIC | Age: 65
End: 2020-03-16

## 2020-03-16 NOTE — TELEPHONE ENCOUNTER
Received Fax on 3/16/2020 from [facility] [phone] requesting Medical Records 12/1/2019 to present with signed [facility]  Scanned documents into chart and faxed to [phone] on 3/16/2020

## 2020-03-20 DIAGNOSIS — Z86.73 HISTORY OF STROKE: ICD-10-CM

## 2020-03-20 RX ORDER — GABAPENTIN 600 MG/1
TABLET ORAL
Qty: 30 TABLET | Refills: 3 | Status: ON HOLD | OUTPATIENT
Start: 2020-03-20 | End: 2021-02-10 | Stop reason: ALTCHOICE

## 2020-03-30 ENCOUNTER — TELEPHONE (OUTPATIENT)
Dept: NEUROLOGY | Facility: CLINIC | Age: 65
End: 2020-03-30

## 2020-03-30 NOTE — TELEPHONE ENCOUNTER
Patient calling in  He states that he has increased his Trazadone 50mg tablets, now taking 1 5 tablets  He states that he thinks thing are going very well on the 75mg (total dose)  Monique Valencia with staying on this dose  Additionally, he is wondering if he should continue taking the gabapentin 600mg caps, 1 cap at bedtime  He states that he saw online somewhere that this can cause shortness of breath and he is concerned because he has COPD and sleep apnea       Please advise    [phone]  Or  [phone]  Monique Valencia to leave a detailed message on both

## 2020-03-30 NOTE — TELEPHONE ENCOUNTER
Okay, we will plan for him to continue the trazodone at the 75 mg dose  The plan is indeed to wean him off of the gabapentin  He can start by decreasing to 1/2 tablet which will be 300 mg for 2 weeks and then discontinue  If he notices that the trazodone is less effective only marginally effective at the 300 mg of gabapentin eating go back up to 600 mg  I would not expected to cause any significant shortness of breath for him at this point in time if it has not already done so  Typically, I would not expect gabapentin to directly cause shortness of breath although it could potentially contribute to some fluid retention  If he notices that he has increased fluid retention in the legs or elsewhere it may be reasonable to decrease at that point, but we are planning to wean it off for the time being anyway

## 2020-05-12 DIAGNOSIS — G47.09 OTHER INSOMNIA: ICD-10-CM

## 2020-05-12 RX ORDER — TRAZODONE HYDROCHLORIDE 50 MG/1
75 TABLET ORAL
Qty: 45 TABLET | Refills: 3 | Status: SHIPPED | OUTPATIENT
Start: 2020-05-12 | End: 2020-08-31

## 2020-05-20 ENCOUNTER — TELEPHONE (OUTPATIENT)
Dept: NEUROLOGY | Facility: CLINIC | Age: 65
End: 2020-05-20

## 2020-06-05 ENCOUNTER — TELEPHONE (OUTPATIENT)
Dept: NEUROLOGY | Facility: CLINIC | Age: 65
End: 2020-06-05

## 2020-08-26 DIAGNOSIS — G47.09 OTHER INSOMNIA: ICD-10-CM

## 2020-08-27 NOTE — TELEPHONE ENCOUNTER
Called patient  He is taking Trazadone 50mg tabs, 2 tabs HS and he is sleeping very well on this dose  He states he was taking 1 5 tabs but still wasn't sleeping well so he increased to 2 tabs  Patient states he does have a side effect of dry mouth  He notes that his tongue feels slightly tingly and feels that he cannot handle hot foods as well as he used to

## 2020-08-31 RX ORDER — TRAZODONE HYDROCHLORIDE 50 MG/1
100 TABLET ORAL
Qty: 60 TABLET | Refills: 2 | Status: SHIPPED | OUTPATIENT
Start: 2020-08-31 | End: 2020-12-31 | Stop reason: SDUPTHER

## 2020-11-23 ENCOUNTER — TELEPHONE (OUTPATIENT)
Dept: NEUROLOGY | Facility: CLINIC | Age: 65
End: 2020-11-23

## 2020-11-23 ENCOUNTER — OFFICE VISIT (OUTPATIENT)
Dept: NEUROLOGY | Facility: CLINIC | Age: 65
End: 2020-11-23
Payer: COMMERCIAL

## 2020-11-23 VITALS — HEIGHT: 73 IN | BODY MASS INDEX: 33.8 KG/M2 | WEIGHT: 255 LBS

## 2020-11-23 DIAGNOSIS — E78.5 HYPERLIPIDEMIA, UNSPECIFIED HYPERLIPIDEMIA TYPE: ICD-10-CM

## 2020-11-23 DIAGNOSIS — G62.9 NEUROPATHY: ICD-10-CM

## 2020-11-23 DIAGNOSIS — Z86.73 HISTORY OF STROKE: Primary | ICD-10-CM

## 2020-11-23 DIAGNOSIS — G47.33 OSA ON CPAP: ICD-10-CM

## 2020-11-23 DIAGNOSIS — Z99.89 OSA ON CPAP: ICD-10-CM

## 2020-11-23 DIAGNOSIS — I69.391 DYSPHAGIA AS LATE EFFECT OF CEREBROVASCULAR ACCIDENT (CVA): ICD-10-CM

## 2020-11-23 PROCEDURE — 99442 PR PHYS/QHP TELEPHONE EVALUATION 11-20 MIN: CPT | Performed by: PSYCHIATRY & NEUROLOGY

## 2020-12-10 ENCOUNTER — TELEPHONE (OUTPATIENT)
Dept: NEUROLOGY | Facility: CLINIC | Age: 65
End: 2020-12-10

## 2020-12-22 ENCOUNTER — TELEPHONE (OUTPATIENT)
Dept: NEUROLOGY | Facility: CLINIC | Age: 65
End: 2020-12-22

## 2020-12-31 DIAGNOSIS — G47.09 OTHER INSOMNIA: ICD-10-CM

## 2020-12-31 RX ORDER — TRAZODONE HYDROCHLORIDE 50 MG/1
100 TABLET ORAL
Qty: 60 TABLET | Refills: 4 | Status: SHIPPED | OUTPATIENT
Start: 2020-12-31 | End: 2021-04-28 | Stop reason: SDUPTHER

## 2021-01-11 ENCOUNTER — TELEPHONE (OUTPATIENT)
Dept: NEUROLOGY | Facility: CLINIC | Age: 66
End: 2021-01-11

## 2021-01-11 NOTE — TELEPHONE ENCOUNTER
Pt called and states that Dr Contreras Payton ordered bw few months ago  He would like to go to [address] in [place]  Asking if all bw can be done at that facility  [facility] ([phone] in [place]) [phone], spoke w/ Cici Jane and states that ALIS screen w/ reflex must be done at  [facility] in [place]  Pt made aware  States that he will get all bw done in [facility] instead since this is closer to his house

## 2021-01-13 ENCOUNTER — TRANSCRIBE ORDERS (OUTPATIENT)
Dept: ADMINISTRATIVE | Facility: HOSPITAL | Age: 66
End: 2021-01-13

## 2021-01-13 DIAGNOSIS — R10.32 ABDOMINAL PAIN, LEFT LOWER QUADRANT: Primary | ICD-10-CM

## 2021-01-14 ENCOUNTER — HOSPITAL ENCOUNTER (OUTPATIENT)
Dept: CT IMAGING | Facility: HOSPITAL | Age: 66
Discharge: HOME | End: 2021-01-14
Payer: COMMERCIAL

## 2021-01-14 DIAGNOSIS — R10.32 ABDOMINAL PAIN, LEFT LOWER QUADRANT: ICD-10-CM

## 2021-01-14 PROCEDURE — G1004 CDSM NDSC: HCPCS

## 2021-01-14 PROCEDURE — 74176 CT ABD & PELVIS W/O CONTRAST: CPT

## 2021-02-05 LAB — HBA1C MFR BLD HPLC: 6.2 %

## 2021-02-10 ENCOUNTER — APPOINTMENT (EMERGENCY)
Dept: CT IMAGING | Facility: HOSPITAL | Age: 66
DRG: 392 | End: 2021-02-10
Payer: COMMERCIAL

## 2021-02-10 ENCOUNTER — HOSPITAL ENCOUNTER (INPATIENT)
Facility: HOSPITAL | Age: 66
LOS: 4 days | Discharge: HOME | DRG: 392 | End: 2021-02-14
Attending: EMERGENCY MEDICINE | Admitting: SURGERY
Payer: COMMERCIAL

## 2021-02-10 DIAGNOSIS — K63.1 BOWEL PERFORATION (HCC): ICD-10-CM

## 2021-02-10 DIAGNOSIS — K76.89 HEPATIC CYST: ICD-10-CM

## 2021-02-10 DIAGNOSIS — K57.92 ACUTE DIVERTICULITIS OF INTESTINE: ICD-10-CM

## 2021-02-10 DIAGNOSIS — R10.9 ABDOMINAL PAIN: Primary | ICD-10-CM

## 2021-02-10 PROBLEM — K21.9 GASTRO-ESOPHAGEAL REFLUX DISEASE WITHOUT ESOPHAGITIS: Status: ACTIVE | Noted: 2021-02-10

## 2021-02-10 PROBLEM — J44.9 CHRONIC OBSTRUCTIVE PULMONARY DISEASE (HCC): Status: ACTIVE | Noted: 2019-07-12

## 2021-02-10 PROBLEM — I10 ESSENTIAL HYPERTENSION: Status: ACTIVE | Noted: 2019-07-12

## 2021-02-10 LAB
ALBUMIN SERPL BCP-MCNC: 3.6 G/DL (ref 3.5–5)
ALP SERPL-CCNC: 75 U/L (ref 46–116)
ALT SERPL W P-5'-P-CCNC: 31 U/L (ref 12–78)
ANION GAP SERPL CALCULATED.3IONS-SCNC: 7 MMOL/L (ref 4–13)
AST SERPL W P-5'-P-CCNC: 16 U/L (ref 5–45)
BASOPHILS # BLD AUTO: 0.05 THOUSANDS/ΜL (ref 0–0.1)
BASOPHILS NFR BLD AUTO: 0 % (ref 0–1)
BILIRUB SERPL-MCNC: 0.8 MG/DL (ref 0.2–1)
BUN SERPL-MCNC: 15 MG/DL (ref 5–25)
CALCIUM SERPL-MCNC: 8.7 MG/DL (ref 8.3–10.1)
CHLORIDE SERPL-SCNC: 101 MMOL/L (ref 100–108)
CO2 SERPL-SCNC: 29 MMOL/L (ref 21–32)
CREAT SERPL-MCNC: 0.98 MG/DL (ref 0.6–1.3)
EOSINOPHIL # BLD AUTO: 0.05 THOUSAND/ΜL (ref 0–0.61)
EOSINOPHIL NFR BLD AUTO: 0 % (ref 0–6)
ERYTHROCYTE [DISTWIDTH] IN BLOOD BY AUTOMATED COUNT: 13.5 % (ref 11.6–15.1)
GFR SERPL CREATININE-BSD FRML MDRD: 82 ML/MIN/1.73SQ M
GLUCOSE SERPL-MCNC: 104 MG/DL (ref 65–140)
HCT VFR BLD AUTO: 49.7 % (ref 36.5–49.3)
HGB BLD-MCNC: 15.9 G/DL (ref 12–17)
IMM GRANULOCYTES # BLD AUTO: 0.05 THOUSAND/UL (ref 0–0.2)
IMM GRANULOCYTES NFR BLD AUTO: 0 % (ref 0–2)
LIPASE SERPL-CCNC: 97 U/L (ref 73–393)
LYMPHOCYTES # BLD AUTO: 1 THOUSANDS/ΜL (ref 0.6–4.47)
LYMPHOCYTES NFR BLD AUTO: 8 % (ref 14–44)
MCH RBC QN AUTO: 28.8 PG (ref 26.8–34.3)
MCHC RBC AUTO-ENTMCNC: 32 G/DL (ref 31.4–37.4)
MCV RBC AUTO: 90 FL (ref 82–98)
MONOCYTES # BLD AUTO: 1.06 THOUSAND/ΜL (ref 0.17–1.22)
MONOCYTES NFR BLD AUTO: 8 % (ref 4–12)
NEUTROPHILS # BLD AUTO: 10.36 THOUSANDS/ΜL (ref 1.85–7.62)
NEUTS SEG NFR BLD AUTO: 84 % (ref 43–75)
NRBC BLD AUTO-RTO: 0 /100 WBCS
PLATELET # BLD AUTO: 219 THOUSANDS/UL (ref 149–390)
PMV BLD AUTO: 9.9 FL (ref 8.9–12.7)
POTASSIUM SERPL-SCNC: 4 MMOL/L (ref 3.5–5.3)
PROT SERPL-MCNC: 8 G/DL (ref 6.4–8.2)
RBC # BLD AUTO: 5.53 MILLION/UL (ref 3.88–5.62)
SODIUM SERPL-SCNC: 137 MMOL/L (ref 136–145)
WBC # BLD AUTO: 12.57 THOUSAND/UL (ref 4.31–10.16)

## 2021-02-10 PROCEDURE — 80053 COMPREHEN METABOLIC PANEL: CPT | Performed by: PHYSICIAN ASSISTANT

## 2021-02-10 PROCEDURE — 85025 COMPLETE CBC W/AUTO DIFF WBC: CPT | Performed by: PHYSICIAN ASSISTANT

## 2021-02-10 PROCEDURE — 83690 ASSAY OF LIPASE: CPT | Performed by: PHYSICIAN ASSISTANT

## 2021-02-10 PROCEDURE — 74177 CT ABD & PELVIS W/CONTRAST: CPT

## 2021-02-10 PROCEDURE — 96375 TX/PRO/DX INJ NEW DRUG ADDON: CPT

## 2021-02-10 PROCEDURE — 99285 EMERGENCY DEPT VISIT HI MDM: CPT | Performed by: PHYSICIAN ASSISTANT

## 2021-02-10 PROCEDURE — 99285 EMERGENCY DEPT VISIT HI MDM: CPT

## 2021-02-10 PROCEDURE — G1004 CDSM NDSC: HCPCS

## 2021-02-10 PROCEDURE — 99242 OFF/OP CONSLTJ NEW/EST SF 20: CPT | Performed by: INTERNAL MEDICINE

## 2021-02-10 PROCEDURE — 99222 1ST HOSP IP/OBS MODERATE 55: CPT | Performed by: SURGERY

## 2021-02-10 PROCEDURE — 36415 COLL VENOUS BLD VENIPUNCTURE: CPT | Performed by: PHYSICIAN ASSISTANT

## 2021-02-10 PROCEDURE — 96361 HYDRATE IV INFUSION ADD-ON: CPT

## 2021-02-10 PROCEDURE — 96374 THER/PROPH/DIAG INJ IV PUSH: CPT

## 2021-02-10 RX ORDER — ACETAMINOPHEN 325 MG/1
650 TABLET ORAL EVERY 6 HOURS PRN
Status: DISCONTINUED | OUTPATIENT
Start: 2021-02-10 | End: 2021-02-14 | Stop reason: HOSPADM

## 2021-02-10 RX ORDER — ASPIRIN 81 MG/1
81 TABLET ORAL DAILY
Status: DISCONTINUED | OUTPATIENT
Start: 2021-02-11 | End: 2021-02-14 | Stop reason: HOSPADM

## 2021-02-10 RX ORDER — SODIUM CHLORIDE 9 MG/ML
125 INJECTION, SOLUTION INTRAVENOUS CONTINUOUS
Status: DISCONTINUED | OUTPATIENT
Start: 2021-02-10 | End: 2021-02-10

## 2021-02-10 RX ORDER — TRAZODONE HYDROCHLORIDE 50 MG/1
100 TABLET ORAL
Status: DISCONTINUED | OUTPATIENT
Start: 2021-02-10 | End: 2021-02-14 | Stop reason: HOSPADM

## 2021-02-10 RX ORDER — ONDANSETRON 2 MG/ML
4 INJECTION INTRAMUSCULAR; INTRAVENOUS ONCE
Status: COMPLETED | OUTPATIENT
Start: 2021-02-10 | End: 2021-02-10

## 2021-02-10 RX ORDER — HYDROMORPHONE HCL/PF 1 MG/ML
1 SYRINGE (ML) INJECTION
Status: DISCONTINUED | OUTPATIENT
Start: 2021-02-10 | End: 2021-02-14 | Stop reason: HOSPADM

## 2021-02-10 RX ORDER — MORPHINE SULFATE 4 MG/ML
4 INJECTION, SOLUTION INTRAMUSCULAR; INTRAVENOUS ONCE
Status: COMPLETED | OUTPATIENT
Start: 2021-02-10 | End: 2021-02-10

## 2021-02-10 RX ORDER — LANOLIN ALCOHOL/MO/W.PET/CERES
6 CREAM (GRAM) TOPICAL
Status: DISCONTINUED | OUTPATIENT
Start: 2021-02-10 | End: 2021-02-14 | Stop reason: HOSPADM

## 2021-02-10 RX ORDER — SODIUM CHLORIDE 9 MG/ML
125 INJECTION, SOLUTION INTRAVENOUS CONTINUOUS
Status: DISCONTINUED | OUTPATIENT
Start: 2021-02-10 | End: 2021-02-12

## 2021-02-10 RX ORDER — HEPARIN SODIUM 5000 [USP'U]/ML
5000 INJECTION, SOLUTION INTRAVENOUS; SUBCUTANEOUS EVERY 8 HOURS SCHEDULED
Status: DISCONTINUED | OUTPATIENT
Start: 2021-02-10 | End: 2021-02-13

## 2021-02-10 RX ORDER — ONDANSETRON 2 MG/ML
4 INJECTION INTRAMUSCULAR; INTRAVENOUS EVERY 6 HOURS PRN
Status: DISCONTINUED | OUTPATIENT
Start: 2021-02-10 | End: 2021-02-14 | Stop reason: HOSPADM

## 2021-02-10 RX ADMIN — ACETAMINOPHEN 650 MG: 325 TABLET, FILM COATED ORAL at 20:17

## 2021-02-10 RX ADMIN — TRAZODONE HYDROCHLORIDE 100 MG: 100 TABLET ORAL at 23:51

## 2021-02-10 RX ADMIN — PIPERACILLIN AND TAZOBACTAM 3.38 G: 3; .375 INJECTION, POWDER, LYOPHILIZED, FOR SOLUTION INTRAVENOUS at 16:50

## 2021-02-10 RX ADMIN — SODIUM CHLORIDE 1000 ML: 0.9 INJECTION, SOLUTION INTRAVENOUS at 14:48

## 2021-02-10 RX ADMIN — MELATONIN 6 MG: at 23:51

## 2021-02-10 RX ADMIN — SODIUM CHLORIDE 125 ML/HR: 0.9 INJECTION, SOLUTION INTRAVENOUS at 21:32

## 2021-02-10 RX ADMIN — HEPARIN SODIUM 5000 UNITS: 5000 INJECTION INTRAVENOUS; SUBCUTANEOUS at 17:27

## 2021-02-10 RX ADMIN — METRONIDAZOLE 500 MG: 500 INJECTION, SOLUTION INTRAVENOUS at 17:29

## 2021-02-10 RX ADMIN — ONDANSETRON 4 MG: 2 INJECTION INTRAMUSCULAR; INTRAVENOUS at 15:27

## 2021-02-10 RX ADMIN — HEPARIN SODIUM 5000 UNITS: 5000 INJECTION INTRAVENOUS; SUBCUTANEOUS at 21:27

## 2021-02-10 RX ADMIN — MORPHINE SULFATE 4 MG: 4 INJECTION INTRAVENOUS at 15:27

## 2021-02-10 RX ADMIN — SODIUM CHLORIDE, SODIUM LACTATE, POTASSIUM CHLORIDE, AND CALCIUM CHLORIDE 1000 ML: .6; .31; .03; .02 INJECTION, SOLUTION INTRAVENOUS at 19:16

## 2021-02-10 RX ADMIN — IOHEXOL 100 ML: 350 INJECTION, SOLUTION INTRAVENOUS at 15:42

## 2021-02-10 RX ADMIN — SODIUM CHLORIDE 125 ML/HR: 0.9 INJECTION, SOLUTION INTRAVENOUS at 16:50

## 2021-02-10 NOTE — H&P
H&P Exam - General Surgery   Lottie Krishna 63 y o  male MRN: [id number]  Unit/Bed#: ED 12 Encounter: [id number]    Assessment/Plan     Assessment:  Acute complicated sigmoid diverticulitis  CAD  Obesity  COPD  Plan:  Acute complicated sigmoid diverticulitis - npo, ivf, iv abx serial exams  COPD - cont current [place] mgt  Obesity - weight loss diet exercise discussed increased surgical morbidity associated with his obesity  CAD - cont asa hold plavix for now, cont med mgt  Incisional hernia - discussed his multiple incisional hernia and he should consider repair in the future although given complex medical and surgical history we will address this as an outpt  History of Present Illness     HPI:  Lottie Krishna is a 63 y o  male who presents with RLQ pain   Pain started yesterday with some back pain but progressed to RLQ worsening throughout the day currently at 7/10 worse with movement better staying still  + diarrhea no nausea or vomting no fevers or chills no sick contacts or travel    Review of Systems    Historical Information   Past Medical History:   Diagnosis Date    Arthritis     [facility]     GERD (gastroesophageal reflux disease)     [facility]     Hypertension     Lung disease     [facility]     Pseudobulbar affect 2019    Sleep apnea     [facility]     TIA (transient ischemic attack)      Past Surgical History:   Procedure Laterality Date    CAROTID STENT      CHOLECYSTECTOMY      HERNIA REPAIR       Social History   Social History     Substance and Sexual Activity   Alcohol Use Not Currently    Comment: rarely     Social History     Substance and Sexual Activity   Drug Use Never     Social History     Tobacco Use   Smoking Status Former Smoker    Quit date: 2018    Years since quittin 7   Smokeless Tobacco Never Used     E-Cigarette/Vaping     E-Cigarette/Vaping Substances     Family History:   Family History Problem Relation Age of Onset    Stroke Mother     Atrial fibrillation Mother     No Known Problems Father     Diabetes Maternal Grandmother     Heart disease Maternal Grandmother     Heart disease Maternal Grandfather        Meds/Allergies   all medications and allergies reviewed  Allergies   Allergen Reactions    Metoprolol Anaphylaxis, Itching and Other (See Comments)     Heart activity  At the ER 20 years ago he was given this medication and pt says "everyone came running and all the bells were going off" but he does not recall the reaction  bradycardia?, has been taking toprol  Patient unsure of allergy reaction      Brilinta [Ticagrelor]     Bee Venom Rash and Hives       Objective   First Vitals:   Blood Pressure: 146/78 (02/10/21 1420)  Pulse: 75 (02/10/21 1420)  Temperature: 97 5 °F (36 4 °C) (02/10/21 1420)  Temp Source: Tympanic (02/10/21 1420)  Respirations: 18 (02/10/21 1420)  Height: 6' 1" (185 4 cm) (02/10/21 1420)  Weight - Scale: 116 kg (255 lb 11 7 oz) (02/10/21 1420)  SpO2: 96 % (02/10/21 1420)    Current Vitals:   Blood Pressure: (!) 187/77 (02/10/21 1430)  Pulse: 72 (02/10/21 1430)  Temperature: 97 5 °F (36 4 °C) (02/10/21 1420)  Temp Source: Tympanic (02/10/21 1420)  Respirations: 20 (02/10/21 1430)  Height: 6' 1" (185 4 cm) (02/10/21 1420)  Weight - Scale: 116 kg (255 lb 11 7 oz) (02/10/21 1420)  SpO2: 97 % (02/10/21 1430)      Intake/Output Summary (Last 24 hours) at 2/10/2021 1654  Last data filed at 2/10/2021 1628  Gross per 24 hour   Intake 1000 ml   Output --   Net 1000 ml       Invasive Devices     Peripheral Intravenous Line            Peripheral IV 02/10/21 Right Antecubital less than 1 day                Physical Exam  Vitals signs and nursing note reviewed  Constitutional:       Appearance: He is well-developed  HENT:      Head: Normocephalic and atraumatic  Eyes:      Conjunctiva/sclera: Conjunctivae normal    Neck:      Musculoskeletal: Neck supple  Cardiovascular:      Rate and Rhythm: Normal rate and regular rhythm  Heart sounds: No murmur  Pulmonary:      Effort: Pulmonary effort is normal  No respiratory distress  Breath sounds: Normal breath sounds  Abdominal:      General: There is no distension  Palpations: Abdomen is soft  There is no mass  Tenderness: There is abdominal tenderness  There is rebound  There is no guarding  Hernia: A hernia is present  Comments: Obese mild TTP in R lower abdomen just off midline   Skin:     General: Skin is warm and dry  Neurological:      Mental Status: He is alert  Lab Results:   I have personally reviewed pertinent lab results  , CBC:   Lab Results   Component Value Date    WBC 12 57 (H) 02/10/2021    HGB 15 9 02/10/2021    HCT 49 7 (H) 02/10/2021    MCV 90 02/10/2021     02/10/2021    MCH 28 8 02/10/2021    MCHC 32 0 02/10/2021    RDW 13 5 02/10/2021    MPV 9 9 02/10/2021    NRBC 0 02/10/2021   , CMP:   Lab Results   Component Value Date    SODIUM 137 02/10/2021    K 4 0 02/10/2021     02/10/2021    CO2 29 02/10/2021    BUN 15 02/10/2021    CREATININE 0 98 02/10/2021    CALCIUM 8 7 02/10/2021    AST 16 02/10/2021    ALT 31 02/10/2021    ALKPHOS 75 02/10/2021    EGFR 82 02/10/2021   , Coagulation: No results found for: PT, INR, APTT, Urinalysis: No results found for: Kathleen Christopher, SPECGRAV, PHUR, LEUKOCYTESUR, NITRITE, PROTEINUA, GLUCOSEU, KETONESU, BILIRUBINUR, BLOODU, Amylase: No results found for: AMYLASE, Lipase:   Lab Results   Component Value Date    LIPASE 97 02/10/2021     Imaging: I have personally reviewed pertinent films in PACS  EKG, Pathology, and Other Studies: I have personally reviewed pertinent reports        Code Status: Level 1 - Full Code  Advance Directive and Living Will:      Power of :    POLST:

## 2021-02-10 NOTE — ASSESSMENT & PLAN NOTE
Present on admission, a/eb abdominal pain, ct scan findings in the setting of recurrenti diverticulitis history  CT scan abdomen/pelvis - Findings consistent with acute, perforated sigmoid colon diverticulitis in the right lower abdomen  No evidence for pericolonic abscess   Multiple fat-containing ventral abdominal wall hernias seen on CT scan  Plan per surgery - is conservative mgt for now  NPO  IVFS  IV antibiotics  Pain control

## 2021-02-10 NOTE — ED PROVIDER NOTES
History  Chief Complaint   Patient presents with    Abdominal Pain     Patient presents to ED with RLQ pain that now radiates to middle of his stomach and radiates around to the middle of his back  Patient reports waking up with the pain this morning , only having diarrhea today and small amount of urine  Patient states he has recently had diverticulitis      Patient is a 62 y/o M with h/o COPD, diverticulitis, TIA, HTN with abdominal pain that started last night  He states he started with right lower back pain last night and now has RLQ abdominal pain  Movement makes the pain worse, nothing makes it better  He did not take anything for pain  No fevers, chills  He states when the pain gets bad he feels nauseated  No vomiting  He had an episode of diarrhea this morning  No blood in stool  He states he has had decreased urination  Patient had diverticulitis last month, but his symptoms resolved  History provided by:  Patient  Abdominal Pain  Pain location:  RLQ  Pain quality: sharp and stabbing    Pain radiates to:  Does not radiate  Pain severity:  Moderate  Onset quality:  Sudden  Duration:  2 days  Timing:  Intermittent  Progression:  Unchanged  Chronicity:  New  Context: not sick contacts, not suspicious food intake and not trauma    Relieved by:  Nothing  Worsened by: Movement  Ineffective treatments:  None tried  Associated symptoms: diarrhea and nausea    Associated symptoms: no chest pain, no chills, no constipation, no cough, no dysuria, no fever, no shortness of breath and no vomiting    Risk factors: obesity    Risk factors: not elderly        Prior to Admission Medications   Prescriptions Last Dose Informant Patient Reported? Taking?    Evolocumab (REPATHA SURECLICK SC)   Yes No   Sig: Inject under the skin Once every 2 weeks   Melatonin 10 MG TABS   Yes No   Sig: Take 2 tablets by mouth daily at bedtime   Omega-3 Fatty Acids (FISH OIL OMEGA-3) 1000 MG CAPS   Yes No   Sig: Take 2 g by mouth daily   TRELEGY ELLIPTA 100-62 5-25 MCG/INH inhaler   Yes No   Si puff daily   aspirin 81 MG tablet   Yes No   Sig: Take 81 mg by mouth daily   clopidogrel (PLAVIX) 75 mg tablet   No No   Sig: Take 1 tablet (75 mg total) by mouth daily   colestipol (COLESTID) 1 g tablet   Yes No   Sig: Take 1 tablet by mouth 2 (two) times a day   ezetimibe (ZETIA) 10 mg tablet   Yes No   Sig: Take 10 mg by mouth daily   gabapentin (NEURONTIN) 600 MG tablet   No No   Sig: TAKE ONE TABLET BY MOUTH at bedtime   Patient not taking: Reported on 2020   isosorbide dinitrate (ISORDIL) 30 mg tablet   Yes No   Sig: Take 30 mg by mouth daily   lisinopril (ZESTRIL) 20 mg tablet   Yes No   Sig: Take 20 mg by mouth daily at bedtime   metoprolol succinate (TOPROL XL) 100 mg 24 hr tablet   Yes No   Sig: Take 100 mg by mouth daily   metoprolol succinate (TOPROL-XL) 25 mg 24 hr tablet   Yes No   Sig: Take 25 mg by mouth daily   nitroglycerin (NITROSTAT) 0 4 mg SL tablet   Yes No   Sig: as needed   omeprazole (PriLOSEC) 40 MG capsule   Yes No   Sig: Take 40 mg by mouth daily   pantoprazole (PROTONIX) 40 mg tablet   Yes No   Sig: Take 40 mg by mouth daily   traZODone (DESYREL) 50 mg tablet   No No   Sig: Take 2 tablets (100 mg total) by mouth daily at bedtime      Facility-Administered Medications: None       Past Medical History:   Diagnosis Date    Arthritis     COPD (chronic obstructive pulmonary disease) (HCC)     GERD (gastroesophageal reflux disease)     [facility] )     Hypertension     Lung disease     Prostate cancer ([facility] )     Pseudobulbar affect 2019    Sleep apnea     Stroke (HCC)     TIA (transient ischemic attack)        Past Surgical History:   Procedure Laterality Date    CAROTID STENT      CHOLECYSTECTOMY      HERNIA REPAIR         Family History   Problem Relation Age of Onset    Stroke Mother     Atrial fibrillation Mother     No Known Problems Father     Diabetes Maternal Grandmother     Heart disease Maternal Grandmother     Heart disease Maternal Grandfather      I have reviewed and agree with the history as documented  E-Cigarette/Vaping     E-Cigarette/Vaping Substances     Social History     Tobacco Use    Smoking status: Former Smoker     Quit date: 2018     Years since quittin 7    Smokeless tobacco: Never Used   Substance Use Topics    Alcohol use: Not Currently     Comment: rarely    Drug use: Never       Review of Systems   Constitutional: Negative for chills and fever  HENT: Negative  Respiratory: Negative for cough and shortness of breath  Cardiovascular: Negative for chest pain and leg swelling  Gastrointestinal: Positive for abdominal pain, diarrhea and nausea  Negative for blood in stool, constipation and vomiting  Genitourinary: Positive for decreased urine volume  Negative for dysuria and frequency  Musculoskeletal: Positive for back pain  Negative for neck pain  Skin: Negative for color change and rash  Neurological: Negative for dizziness and numbness  Psychiatric/Behavioral: Negative for confusion  All other systems reviewed and are negative  Physical Exam  Physical Exam  Vitals signs and nursing note reviewed  Constitutional:       General: He is in acute distress  Appearance: Normal appearance  He is well-developed and well-groomed  He is obese  He is not ill-appearing  HENT:      Head: Normocephalic and atraumatic  Eyes:      Conjunctiva/sclera: Conjunctivae normal    Neck:      Musculoskeletal: Normal range of motion  Cardiovascular:      Rate and Rhythm: Normal rate and regular rhythm  Heart sounds: Normal heart sounds  Pulmonary:      Effort: Pulmonary effort is normal       Breath sounds: No wheezing, rhonchi or rales  Abdominal:      General: Abdomen is flat  Bowel sounds are normal       Tenderness: There is abdominal tenderness in the right lower quadrant  There is guarding  There is no rebound   Positive signs include Rovsing's sign, McBurney's sign and obturator sign  Negative signs include psoas sign  Hernia: A hernia is present  Hernia is present in the umbilical area and ventral area  Musculoskeletal: Normal range of motion  Right lower leg: No edema  Left lower leg: No edema  Skin:     General: Skin is warm and dry  Coloration: Skin is not pale  Findings: No rash  Neurological:      General: No focal deficit present  Mental Status: He is alert and oriented to person, place, and time  Psychiatric:         Mood and Affect: Mood normal          Behavior: Behavior is cooperative           Vital Signs  ED Triage Vitals [02/10/21 1420]   Temperature Pulse Respirations Blood Pressure SpO2   97 5 °F (36 4 °C) 75 18 146/78 96 %      Temp Source Heart Rate Source Patient Position - Orthostatic VS BP Location FiO2 (%)   Tympanic Monitor Lying Right arm --      Pain Score       Worst Possible Pain           Vitals:    02/10/21 1420 02/10/21 1430 02/10/21 1530   BP: 146/78 (!) 187/77 148/70   Pulse: 75 72 72   Patient Position - Orthostatic VS: Lying           Visual Acuity      ED Medications  Medications   sodium chloride 0 9 % infusion (125 mL/hr Intravenous New Bag 2/10/21 1650)   lactated ringers bolus 1,000 mL (has no administration in time range)   ondansetron (ZOFRAN) injection 4 mg (has no administration in time range)   piperacillin-tazobactam (ZOSYN) IVPB 3 375 g (has no administration in time range)     And   metroNIDAZOLE (FLAGYL) IVPB (premix) 500 mg 100 mL (500 mg Intravenous New Bag 2/10/21 1729)   heparin (porcine) subcutaneous injection 5,000 Units (5,000 Units Subcutaneous Given 2/10/21 1727)   HYDROmorphone (DILAUDID) injection 1 mg (has no administration in time range)   aspirin (ECOTRIN LOW STRENGTH) EC tablet 81 mg (has no administration in time range)   sodium chloride 0 9 % bolus 1,000 mL (0 mL Intravenous Stopped 2/10/21 1628)   morphine (PF) 4 mg/mL injection 4 mg (4 mg Intravenous Given 2/10/21 1527)   ondansetron (ZOFRAN) injection 4 mg (4 mg Intravenous Given 2/10/21 1527)   iohexol (OMNIPAQUE) 350 MG/ML injection (MULTI-DOSE) 100 mL (100 mL Intravenous Given 2/10/21 1542)   piperacillin-tazobactam (ZOSYN) IVPB 3 375 g (0 g Intravenous Stopped 2/10/21 1729)       Diagnostic Studies  Results Reviewed     Procedure Component Value Units Date/Time    Platelet count [[id number]]     Lab Status: No result Specimen: Blood     Comprehensive metabolic panel [[id number]] Collected: 02/10/21 1437    Lab Status: Final result Specimen: Blood from Arm, Right Updated: 02/10/21 1529     Sodium 137 mmol/L      Potassium 4 0 mmol/L      Chloride 101 mmol/L      CO2 29 mmol/L      ANION GAP 7 mmol/L      BUN 15 mg/dL      Creatinine 0 98 mg/dL      Glucose 104 mg/dL      Calcium 8 7 mg/dL      AST 16 U/L      ALT 31 U/L      Alkaline Phosphatase 75 U/L      Total Protein 8 0 g/dL      Albumin 3 6 g/dL      Total Bilirubin 0 80 mg/dL      eGFR 82 ml/min/1 73sq m     Narrative:      [place] guidelines for Chronic Kidney Disease (CKD):     Stage 1 with normal or high GFR (GFR > 90 mL/min/1 73 square meters)    Stage 2 Mild CKD (GFR = 60-89 mL/min/1 73 square meters)    Stage 3A Moderate CKD (GFR = 45-59 mL/min/1 73 square meters)    Stage 3B Moderate CKD (GFR = 30-44 mL/min/1 73 square meters)    Stage 4 Severe CKD (GFR = 15-29 mL/min/1 73 square meters)    Stage 5 End Stage CKD (GFR <15 mL/min/1 73 square meters)  Note: GFR calculation is accurate only with a steady state creatinine    Lipase [[id number]]  (Normal) Collected: 02/10/21 1437    Lab Status: Final result Specimen: Blood from Arm, Right Updated: 02/10/21 1529     Lipase 97 u/L     CBC and differential [[id number]]  (Abnormal) Collected: 02/10/21 1437    Lab Status: Final result Specimen: Blood from Arm, Right Updated: 02/10/21 1511     WBC 12 57 Thousand/uL      RBC 5 53 Million/uL      Hemoglobin 15 9 g/dL      Hematocrit 49 7 %      MCV 90 fL      MCH 28 8 pg      MCHC 32 0 g/dL      RDW 13 5 %      MPV 9 9 fL      Platelets 290 Thousands/uL      nRBC 0 /100 WBCs      Neutrophils Relative 84 %      Immat GRANS % 0 %      Lymphocytes Relative 8 %      Monocytes Relative 8 %      Eosinophils Relative 0 %      Basophils Relative 0 %      Neutrophils Absolute 10 36 Thousands/µL      Immature Grans Absolute 0 05 Thousand/uL      Lymphocytes Absolute 1 00 Thousands/µL      Monocytes Absolute 1 06 Thousand/µL      Eosinophils Absolute 0 05 Thousand/µL      Basophils Absolute 0 05 Thousands/µL     UA w Reflex to Microscopic w Reflex to Culture [[id number]]     Lab Status: No result Specimen: Urine, Clean Catch                  CT abdomen pelvis with contrast   Final Result by Cindi Barillas MD (02/10 1611)      Findings consistent with acute, perforated sigmoid colon diverticulitis in the right lower abdomen  No evidence for pericolonic abscess  Multiple fat-containing ventral abdominal wall hernias  The study was marked in [facility] for immediate notification  Workstation performed: [id number]                    Procedures  Procedures         ED Course  ED Course as of Feb 10 1752   Wed Feb 10, 2021   1627 Surgery paged  SBIRT 22yo+      Most Recent Value   SBIRT (24 yo +)   In order to provide better care to our patients, we are screening all of our patients for alcohol and drug use  Would it be okay to ask you these screening questions?   No Filed at: 02/10/2021 1530                    MDM  Number of Diagnoses or Management Options  Abdominal pain: new and requires workup  Acute diverticulitis of intestine: new and requires workup  Bowel perforation [facility]): new and requires workup  Hepatic cyst: established and worsening  Diagnosis management comments: Patient with RLQ abdominal pain, will obtain labs, CT abd and pelvis to r/o appendicitis, diverticulitis, colitis, UTI   Patient with diverticulitis with perf, will start IV abx and consult surgery  Amount and/or Complexity of Data Reviewed  Clinical lab tests: ordered and reviewed  Tests in the radiology section of CPT®: ordered and reviewed  Discuss the patient with other providers: yes (DR Marie  )    Patient Progress  Patient progress: stable      Disposition  Final diagnoses:   Abdominal pain   Acute diverticulitis of intestine   Hepatic cyst   Bowel perforation ([address] )     Time reflects when diagnosis was documented in both MDM as applicable and the Disposition within this note     Time User Action Codes Description Comment    2/10/2021  4:25 PM Winferd Nones Add [R10 9] Abdominal pain     2/10/2021  4:25 PM Winferd Nones Add [K57 92] Acute diverticulitis of intestine     2/10/2021  4:27 PM Winferd Nones Add [Q30 84] Hepatic cyst     2/10/2021  4:28 PM Winferd Nones Add [K63 1] Bowel perforation [facility])       ED Disposition     ED Disposition Condition Date/Time Comment    Admit Stable Wed Feb 10, 2021  4:57 PM Case was discussed with Dr Nery Chow and the patient's admission status was agreed to be Admission Status: inpatient status to the service of Dr Nery Chow   Follow-up Information    None         Patient's Medications   Discharge Prescriptions    No medications on file     No discharge procedures on file      PDMP Review     None          ED Provider  Electronically Signed by           Nallely Dye PA-C  02/10/21 0987

## 2021-02-11 LAB
ALBUMIN SERPL BCP-MCNC: 2.7 G/DL (ref 3.5–5)
ALP SERPL-CCNC: 64 U/L (ref 46–116)
ALT SERPL W P-5'-P-CCNC: 22 U/L (ref 12–78)
ANION GAP SERPL CALCULATED.3IONS-SCNC: 9 MMOL/L (ref 4–13)
AST SERPL W P-5'-P-CCNC: 15 U/L (ref 5–45)
BILIRUB SERPL-MCNC: 1.1 MG/DL (ref 0.2–1)
BUN SERPL-MCNC: 12 MG/DL (ref 5–25)
CALCIUM ALBUM COR SERPL-MCNC: 9.1 MG/DL (ref 8.3–10.1)
CALCIUM SERPL-MCNC: 8.1 MG/DL (ref 8.3–10.1)
CHLORIDE SERPL-SCNC: 102 MMOL/L (ref 100–108)
CO2 SERPL-SCNC: 25 MMOL/L (ref 21–32)
CREAT SERPL-MCNC: 1.02 MG/DL (ref 0.6–1.3)
ERYTHROCYTE [DISTWIDTH] IN BLOOD BY AUTOMATED COUNT: 13.7 % (ref 11.6–15.1)
GFR SERPL CREATININE-BSD FRML MDRD: 78 ML/MIN/1.73SQ M
GLUCOSE SERPL-MCNC: 115 MG/DL (ref 65–140)
HCT VFR BLD AUTO: 42.7 % (ref 36.5–49.3)
HGB BLD-MCNC: 13.7 G/DL (ref 12–17)
MAGNESIUM SERPL-MCNC: 1.7 MG/DL (ref 1.6–2.6)
MCH RBC QN AUTO: 29.1 PG (ref 26.8–34.3)
MCHC RBC AUTO-ENTMCNC: 32.1 G/DL (ref 31.4–37.4)
MCV RBC AUTO: 91 FL (ref 82–98)
PLATELET # BLD AUTO: 170 THOUSANDS/UL (ref 149–390)
PMV BLD AUTO: 9.5 FL (ref 8.9–12.7)
POTASSIUM SERPL-SCNC: 3.7 MMOL/L (ref 3.5–5.3)
PROT SERPL-MCNC: 6.5 G/DL (ref 6.4–8.2)
RBC # BLD AUTO: 4.71 MILLION/UL (ref 3.88–5.62)
SODIUM SERPL-SCNC: 136 MMOL/L (ref 136–145)
WBC # BLD AUTO: 9.96 THOUSAND/UL (ref 4.31–10.16)

## 2021-02-11 PROCEDURE — 94760 N-INVAS EAR/PLS OXIMETRY 1: CPT

## 2021-02-11 PROCEDURE — 99232 SBSQ HOSP IP/OBS MODERATE 35: CPT | Performed by: SURGERY

## 2021-02-11 PROCEDURE — C9113 INJ PANTOPRAZOLE SODIUM, VIA: HCPCS | Performed by: PHYSICIAN ASSISTANT

## 2021-02-11 PROCEDURE — 85027 COMPLETE CBC AUTOMATED: CPT | Performed by: SURGERY

## 2021-02-11 PROCEDURE — 83735 ASSAY OF MAGNESIUM: CPT | Performed by: SURGERY

## 2021-02-11 PROCEDURE — 99232 SBSQ HOSP IP/OBS MODERATE 35: CPT | Performed by: INTERNAL MEDICINE

## 2021-02-11 PROCEDURE — 80053 COMPREHEN METABOLIC PANEL: CPT | Performed by: SURGERY

## 2021-02-11 RX ORDER — PANTOPRAZOLE SODIUM 40 MG/1
40 INJECTION, POWDER, FOR SOLUTION INTRAVENOUS
Status: DISCONTINUED | OUTPATIENT
Start: 2021-02-11 | End: 2021-02-14 | Stop reason: HOSPADM

## 2021-02-11 RX ADMIN — MELATONIN 6 MG: at 22:02

## 2021-02-11 RX ADMIN — HEPARIN SODIUM 5000 UNITS: 5000 INJECTION INTRAVENOUS; SUBCUTANEOUS at 13:45

## 2021-02-11 RX ADMIN — METRONIDAZOLE 500 MG: 500 INJECTION, SOLUTION INTRAVENOUS at 09:26

## 2021-02-11 RX ADMIN — PIPERACILLIN AND TAZOBACTAM 3.38 G: 3; .375 INJECTION, POWDER, LYOPHILIZED, FOR SOLUTION INTRAVENOUS at 10:23

## 2021-02-11 RX ADMIN — HEPARIN SODIUM 5000 UNITS: 5000 INJECTION INTRAVENOUS; SUBCUTANEOUS at 22:02

## 2021-02-11 RX ADMIN — HYDROMORPHONE HYDROCHLORIDE 1 MG: 1 INJECTION, SOLUTION INTRAMUSCULAR; INTRAVENOUS; SUBCUTANEOUS at 09:25

## 2021-02-11 RX ADMIN — TRAZODONE HYDROCHLORIDE 100 MG: 100 TABLET ORAL at 22:02

## 2021-02-11 RX ADMIN — PIPERACILLIN AND TAZOBACTAM 3.38 G: 3; .375 INJECTION, POWDER, LYOPHILIZED, FOR SOLUTION INTRAVENOUS at 17:39

## 2021-02-11 RX ADMIN — PIPERACILLIN AND TAZOBACTAM 3.38 G: 3; .375 INJECTION, POWDER, LYOPHILIZED, FOR SOLUTION INTRAVENOUS at 05:49

## 2021-02-11 RX ADMIN — ASPIRIN 81 MG: 81 TABLET, COATED ORAL at 09:08

## 2021-02-11 RX ADMIN — PIPERACILLIN AND TAZOBACTAM 3.38 G: 3; .375 INJECTION, POWDER, LYOPHILIZED, FOR SOLUTION INTRAVENOUS at 00:11

## 2021-02-11 RX ADMIN — HYDROMORPHONE HYDROCHLORIDE 1 MG: 1 INJECTION, SOLUTION INTRAMUSCULAR; INTRAVENOUS; SUBCUTANEOUS at 13:45

## 2021-02-11 RX ADMIN — METRONIDAZOLE 500 MG: 500 INJECTION, SOLUTION INTRAVENOUS at 17:39

## 2021-02-11 RX ADMIN — HEPARIN SODIUM 5000 UNITS: 5000 INJECTION INTRAVENOUS; SUBCUTANEOUS at 05:49

## 2021-02-11 RX ADMIN — HYDROMORPHONE HYDROCHLORIDE 1 MG: 1 INJECTION, SOLUTION INTRAMUSCULAR; INTRAVENOUS; SUBCUTANEOUS at 19:54

## 2021-02-11 RX ADMIN — PANTOPRAZOLE SODIUM 40 MG: 40 INJECTION, POWDER, FOR SOLUTION INTRAVENOUS at 10:24

## 2021-02-11 RX ADMIN — SODIUM CHLORIDE 125 ML/HR: 0.9 INJECTION, SOLUTION INTRAVENOUS at 17:38

## 2021-02-11 RX ADMIN — METOPROLOL SUCCINATE 125 MG: 50 TABLET, EXTENDED RELEASE ORAL at 09:07

## 2021-02-11 RX ADMIN — PIPERACILLIN AND TAZOBACTAM 3.38 G: 3; .375 INJECTION, POWDER, LYOPHILIZED, FOR SOLUTION INTRAVENOUS at 22:02

## 2021-02-11 RX ADMIN — SODIUM CHLORIDE 125 ML/HR: 0.9 INJECTION, SOLUTION INTRAVENOUS at 05:55

## 2021-02-11 RX ADMIN — METRONIDAZOLE 500 MG: 500 INJECTION, SOLUTION INTRAVENOUS at 01:26

## 2021-02-11 NOTE — PLAN OF CARE
Problem: PAIN - ADULT  Goal: Verbalizes/displays adequate comfort level or baseline comfort level  Description: Interventions:  - Encourage patient to monitor pain and request assistance  - Assess pain using appropriate pain scale  - Administer analgesics based on type and severity of pain and evaluate response  - Implement non-pharmacological measures as appropriate and evaluate response  - Consider cultural and social influences on pain and pain management  - Notify physician/advanced practitioner if interventions unsuccessful or patient reports new pain  Outcome: Progressing     Problem: INFECTION - ADULT  Goal: Absence or prevention of progression during hospitalization  Description: INTERVENTIONS:  - Assess and monitor for signs and symptoms of infection  - Monitor lab/diagnostic results  - Monitor all insertion sites, i e  indwelling lines, tubes, and drains  - Monitor endotracheal if appropriate and nasal secretions for changes in amount and color  - [place] appropriate cooling/warming therapies per order  - Administer medications as ordered  - Instruct and encourage patient and family to use good hand hygiene technique  - Identify and instruct in appropriate isolation precautions for identified infection/condition  Outcome: Progressing  Goal: Absence of fever/infection during neutropenic period  Description: INTERVENTIONS:  - Monitor WBC    Outcome: Progressing     Problem: SAFETY ADULT  Goal: Patient will remain free of falls  Description: INTERVENTIONS:  - Assess patient frequently for physical needs  -  Identify cognitive and physical deficits and behaviors that affect risk of falls    -  [place] fall precautions as indicated by assessment   - Educate patient/family on patient safety including physical limitations  - Instruct patient to call for assistance with activity based on assessment  - Modify environment to reduce risk of injury  - Consider OT/PT consult to assist with strengthening/mobility  Outcome: Progressing  Goal: Maintain or return to baseline ADL function  Description: INTERVENTIONS:  -  Assess patient's ability to carry out ADLs; assess patient's baseline for ADL function and identify physical deficits which impact ability to perform ADLs (bathing, care of mouth/teeth, toileting, grooming, dressing, etc )  - Assess/evaluate cause of self-care deficits   - Assess range of motion  - Assess patient's mobility; develop plan if impaired  - Assess patient's need for assistive devices and provide as appropriate  - Encourage maximum independence but intervene and supervise when necessary  - Involve family in performance of ADLs  - Assess for home care needs following discharge   - Consider OT consult to assist with ADL evaluation and planning for discharge  - Provide patient education as appropriate  Outcome: Progressing  Goal: Maintain or return mobility status to optimal level  Description: INTERVENTIONS:  - Assess patient's baseline mobility status (ambulation, transfers, stairs, etc )    - Identify cognitive and physical deficits and behaviors that affect mobility  - Identify mobility aids required to assist with transfers and/or ambulation (gait belt, sit-to-stand, lift, walker, cane, etc )  - [place] fall precautions as indicated by assessment  - Record patient progress and toleration of activity level on Mobility SBAR; progress patient to next Phase/Stage  - Instruct patient to call for assistance with activity based on assessment  - Consider rehabilitation consult to assist with strengthening/weightbearing, etc   Outcome: Progressing     Problem: DISCHARGE PLANNING  Goal: Discharge to home or other facility with appropriate resources  Description: INTERVENTIONS:  - Identify barriers to discharge w/patient and caregiver  - Arrange for needed discharge resources and transportation as appropriate  - Identify discharge learning needs (meds, wound care, etc )  - Arrange for interpretive services to assist at discharge as needed  - Refer to Case Management Department for coordinating discharge planning if the patient needs post-hospital services based on physician/advanced practitioner order or complex needs related to functional status, cognitive ability, or social support system  Outcome: Progressing     Problem: Knowledge Deficit  Goal: Patient/family/caregiver demonstrates understanding of disease process, treatment plan, medications, and discharge instructions  Description: Complete learning assessment and assess knowledge base    Interventions:  - Provide teaching at level of understanding  - Provide teaching via preferred learning methods  Outcome: Progressing

## 2021-02-11 NOTE — CASE MANAGEMENT
LOS: 1  Patient is not a 30 day readmission or a Medicare Bundled patient  His risk for unplanned readmission is 12, [place]  Met with patient and reviewed the discharge planning process including identifying help at home and patient preference for discharge  Ppatient reports residing with his wife in a modular home with 1 ARI  He reports being independent of ADl's and using no assistive device  He uses [facility] in [place] for his meds and has a prescription plan  His PCP  is Royal Franco  He denies SNF/MH/D&A rehab admission  He was followed by [facility] in the past  He does not have a POA/LW, information was provided to him  His wife works from home and is his help at home  He plans to return home and anticipates no discharge needs  His wife will transport him home

## 2021-02-11 NOTE — ASSESSMENT & PLAN NOTE
Present on admission, a/eb abdominal pain, ct scan findings in the setting of recurrent diverticulitis history  CT scan abdomen/pelvis - Findings consistent with acute, perforated sigmoid colon diverticulitis in the right lower abdomen  No evidence for pericolonic abscess   Multiple fat-containing ventral abdominal wall hernias seen on CT scan  Plan per surgery - is conservative mgt for now  NPO  IVFS  IV antibiotics  Pain control

## 2021-02-11 NOTE — UTILIZATION REVIEW
Initial Clinical Review    Admission: Date/Time/Statement:   Admission Orders (From admission, onward)     Ordered        02/10/21 1654  INPATIENT ADMISSION  Once                   Orders Placed This Encounter   Procedures    INPATIENT ADMISSION     Standing Status:   Standing     Number of Occurrences:   1     Order Specific Question:   Level of Care     Answer:   Med Surg [16]     Order Specific Question:   Estimated length of stay     Answer:   More than 2 Midnights     Order Specific Question:   Certification     Answer:   I certify that inpatient services are medically necessary for this patient for a duration of greater than two midnights  See H&P and MD Progress Notes for additional information about the patient's course of treatment  ED Arrival Information     Expected Arrival Acuity Means of Arrival Escorted By Service Admission Type    - 2/10/2021 13:57 Urgent Wheelchair Spouse Surgery-General Urgent    Arrival Complaint    abd pain        Chief Complaint   Patient presents with    Abdominal Pain     Patient presents to ED with RLQ pain that now radiates to middle of his stomach and radiates around to the middle of his back  Patient reports waking up with the pain this morning , only having diarrhea today and small amount of urine  Patient states he has recently had diverticulitis      Assessment/Plan: this is a 63year old male from home to ED admitted inpatient due to acute complicated sigmoid diverticulitis  Presented due to lower back pain starting nite prior to arrival and today with RLQ abdominal pain, has nausea, diarrhea this morning  On exam abdominal tenderness RLQ, guarding  + Rovsing, McBurney, obturator sign  umbilical  hernia  Wbc 12 57  Ct showed perforated sigmoid diverticulitis  Plan is NPO, IVF, IV antibiotics, NPO  Medicine consulted  2/10/2021 per medicine - patient iwht history of GERD, COPD, hpt, YOLA on cpap, hyperlipidemia and CAD post stent  Plavix on hold  Continue home inhalers  Hold lisinopril and use metoprolol as needed for hpt  On 2/11/2021:  Patient has ongoing pain, some improvement  On exam abdominal tenderness RLQ  To continue NPO, IVF, IV anitbiotics, IV metoprolol in progress  ED Triage Vitals [02/10/21 1420]   Temperature Pulse Respirations Blood Pressure SpO2   97 5 °F (36 4 °C) 75 18 146/78 96 %      Temp Source Heart Rate Source Patient Position - Orthostatic VS BP Location FiO2 (%)   Tympanic Monitor Lying Right arm --      Pain Score       Worst Possible Pain          Wt Readings from Last 1 Encounters:   02/11/21 115 kg (253 lb 12 oz)     Additional Vital Signs:   02/11/21 08:04:02  --  60  18  122/65  84  95 %  --  --  --   02/11/21 0204  --  --  --  --  --  --  --   --  --   O2 Device: home CPAP machine at 02/11/21 0204   02/11/21 0100  --  --  --  --  --  95 %  None (Room air)  Nasal mask  --   02/11/21 00:11:59  --  73  --  110/66  81  94 %  --  --  --   02/10/21 23:52:40  98 8 °F (37 1 °C)  62  --  --  --  95 %  --  --  --   02/10/21 19:40:51  101 6 °F (38 7 °C)Abnormal   67  18  130/78  95  94 %  None (Room air           Pertinent Labs/Diagnostic Test Results:   2/10/2021 Ct abdomen  Findings consistent with acute, perforated sigmoid colon diverticulitis in the right lower abdomen  No evidence for pericolonic abscess      Multiple fat-containing ventral abdominal wall hernias    Results from last 7 days   Lab Units 02/11/21  0503 02/10/21  1437   WBC Thousand/uL 9 96 12 57*   HEMOGLOBIN g/dL 13 7 15 9   HEMATOCRIT % 42 7 49 7*   PLATELETS Thousands/uL 170 219   NEUTROS ABS Thousands/µL  --  10 36*     Results from last 7 days   Lab Units 02/11/21  0503 02/10/21  1437   SODIUM mmol/L 136 137   POTASSIUM mmol/L 3 7 4 0   CHLORIDE mmol/L 102 101   CO2 mmol/L 25 29   ANION GAP mmol/L 9 7   BUN mg/dL 12 15   CREATININE mg/dL 1 02 0 98   EGFR ml/min/1 73sq m 78 82   CALCIUM mg/dL 8 1* 8 7   MAGNESIUM mg/dL 1 7  --      Results from last 7 days   Lab Units 02/11/21  0503 02/10/21  1437   AST U/L 15 16   ALT U/L 22 31   ALK PHOS U/L 64 75   TOTAL PROTEIN g/dL 6 5 8 0   ALBUMIN g/dL 2 7* 3 6   TOTAL BILIRUBIN mg/dL 1 10* 0 80     Results from last 7 days   Lab Units 02/11/21  0503 02/10/21  1437   GLUCOSE RANDOM mg/dL 115 104     Results from last 7 days   Lab Units 02/05/21  1214   HEMOGLOBIN A1C % 6 2*   EAG mg/dL 131     Results from last 7 days   Lab Units 02/10/21  1437   LIPASE u/L 97       ED Treatment:   Medication Administration from 02/10/2021 1357 to 02/10/2021 1851       Date/Time Order Dose Route Action Comments     02/10/2021 1448 sodium chloride 0 9 % bolus 1,000 mL 1,000 mL Intravenous New Bag      02/10/2021 1527 morphine (PF) 4 mg/mL injection 4 mg 4 mg Intravenous Given      02/10/2021 1527 ondansetron (ZOFRAN) injection 4 mg 4 mg Intravenous Given      02/10/2021 1650 piperacillin-tazobactam (ZOSYN) IVPB 3 375 g 3 375 g Intravenous New Bag      02/10/2021 1650 sodium chloride 0 9 % infusion 125 mL/hr Intravenous New Bag      02/10/2021 1729 metroNIDAZOLE (FLAGYL) IVPB (premix) 500 mg 100 mL 500 mg Intravenous New Bag      02/10/2021 1727 heparin (porcine) subcutaneous injection 5,000 Units 5,000 Units Subcutaneous Given         Past Medical History:   Diagnosis Date    Arthritis     [facility]     GERD (gastroesophageal reflux disease)     [facility]     Hypertension     Lung disease     Prostate cancer ([facility]     Pseudobulbar affect 6/13/2019    Sleep apnea     Stroke ([facility]     TIA (transient ischemic attack)      Present on Admission:   Hyperlipidemia   Chronic obstructive pulmonary disease ([facility]   Essential hypertension   Gastro-esophageal reflux disease without esophagitis   Prostate cancer ([facility]   Atherosclerosis of native coronary artery of native heart without angina pectoris      Admitting Diagnosis: Hepatic cyst [K76 89]  Bowel perforation ([facility] ) [K63 1]  Abdominal pain [R10 9]  Acute diverticulitis of intestine [K57 92]  Age/Sex: 63 y o  male  Admission Orders: 2/10/2021 1654 inpatient   Scheduled Medications:  aspirin, 81 mg, Oral, Daily  heparin (porcine), 5,000 Units, Subcutaneous, Q8H [facility]  melatonin, 6 mg, Oral, HS  metoprolol succinate, 125 mg, Oral, Daily  piperacillin-tazobactam, 3 375 g, Intravenous, Q6H    And  metroNIDAZOLE, 500 mg, Intravenous, Q8H  pantoprazole, 40 mg, Intravenous, Q24H ALBER  traZODone, 100 mg, Oral, HS      Continuous IV Infusions:  sodium chloride, 125 mL/hr, Intravenous, Continuous      PRN Meds:  acetaminophen, 650 mg, Oral, Q6H PRN - used x 1 2/10/2021   HYDROmorphone, 1 mg, Intravenous, Q3H PRN - used x 1 2/11/2021   ondansetron, 4 mg, Intravenous, Q6H PRN    NPO  cpap at bedtime     IP CONSULT TO INTERNAL MEDICINE    Network Utilization Review Department  ATTENTION: Please call with any questions or concerns to [phone] and carefully listen to the prompts so that you are directed to the right person  All voicemails are confidential   Caroline Escamilla all requests for admission clinical reviews, approved or denied determinations and any other requests to dedicated fax number below belonging to the campus where the patient is receiving treatment   List of dedicated fax numbers for the Facilities:  [address] DENIALS (Administrative/Medical Necessity) [phone]   [address] (Maternity/NICU/Pediatrics) [phone]   [address] [phone]   [address] [address] [facility] Stevenida Milton Alves 2025 (  Sabi Campbell "Kimberly" 103) [address] [address] [place] [id number] [place] [address] [phone]

## 2021-02-11 NOTE — PROGRESS NOTES
Progress Note - Daya Smith 63 y o  male MRN: [id number]    Unit/Bed#: -01 Encounter: [id number]      Assessment/Plan    Acute complicated sigmoid diverticulitis  -Keep patient NPO, IV abx ( Zoysn and Flagyl), IVF  -Leukocytosis improved this am-9 96  -Pt still with significant pain on exam, states it is better than yesterday though    CAD, hx of bypass surgery 14 years ago  - on Asa, Plavix being held    HTN  -Pt NPO, lisinopril and metoprolol being held  -on IV lopressor    GERD  - IV protonix    COPD, Obesity, YOLA, Hyperlipidemia, hx of AAA repair  - cont home meds, medicine following  Appreciate recs        Subjective:   61yo male admitted yesterday for sigmoid diverticulitis  He reports his pain is a bit improved since yesterday  +passing gas, no BMs  Has been NPO  Objective:     Vitals: Blood pressure 122/65, pulse 60, temperature 98 8 °F (37 1 °C), resp  rate 18, height 6' 1" (1 854 m), weight 115 kg (253 lb 12 oz), SpO2 95 %  ,Body mass index is 33 48 kg/m²  Intake/Output Summary (Last 24 hours) at 2/11/2021 0857  Last data filed at 2/11/2021 0555  Gross per 24 hour   Intake 3000 ml   Output 900 ml   Net 2100 ml       Physical Exam:  Gen: Obese gentleman sitting in bed, NAD  Cardiovascular: RRR, No murmur  Pulm: CTAB  Abdomen: soft, distended, abdominal tenderness- mainly in the right lower quadrant, +bs  +incisional hernia  MSK: normal movement  of all joints  Neuro: Alert, awake, chapman, speech nl, face symmetric    Invasive Devices     Peripheral Intravenous Line            Peripheral IV 02/10/21 Right Antecubital less than 1 day                Lab, Imaging and other studies: I have personally reviewed pertinent reports      VTE Pharmacologic Prophylaxis: Sequential compression device (Venodyne)   VTE Mechanical Prophylaxis: sequential compression device

## 2021-02-11 NOTE — PLAN OF CARE
Problem: PAIN - ADULT  Goal: Verbalizes/displays adequate comfort level or baseline comfort level  Description: Interventions:  - Encourage patient to monitor pain and request assistance  - Assess pain using appropriate pain scale  - Administer analgesics based on type and severity of pain and evaluate response  - Implement non-pharmacological measures as appropriate and evaluate response  - Consider cultural and social influences on pain and pain management  - Notify physician/advanced practitioner if interventions unsuccessful or patient reports new pain  Outcome: Progressing     Problem: INFECTION - ADULT  Goal: Absence or prevention of progression during hospitalization  Description: INTERVENTIONS:  - Assess and monitor for signs and symptoms of infection  - Monitor lab/diagnostic results  - Monitor all insertion sites, i e  indwelling lines, tubes, and drains  - Monitor endotracheal if appropriate and nasal secretions for changes in amount and color  - [place] appropriate cooling/warming therapies per order  - Administer medications as ordered  - Instruct and encourage patient and family to use good hand hygiene technique  - Identify and instruct in appropriate isolation precautions for identified infection/condition  Outcome: Progressing  Goal: Absence of fever/infection during neutropenic period  Description: INTERVENTIONS:  - Monitor WBC    Outcome: Progressing     Problem: SAFETY ADULT  Goal: Patient will remain free of falls  Description: INTERVENTIONS:  - Assess patient frequently for physical needs  -  Identify cognitive and physical deficits and behaviors that affect risk of falls    -  [place] fall precautions as indicated by assessment   - Educate patient/family on patient safety including physical limitations  - Instruct patient to call for assistance with activity based on assessment  - Modify environment to reduce risk of injury  - Consider OT/PT consult to assist with strengthening/mobility  Outcome: Progressing  Goal: Maintain or return to baseline ADL function  Description: INTERVENTIONS:  -  Assess patient's ability to carry out ADLs; assess patient's baseline for ADL function and identify physical deficits which impact ability to perform ADLs (bathing, care of mouth/teeth, toileting, grooming, dressing, etc )  - Assess/evaluate cause of self-care deficits   - Assess range of motion  - Assess patient's mobility; develop plan if impaired  - Assess patient's need for assistive devices and provide as appropriate  - Encourage maximum independence but intervene and supervise when necessary  - Involve family in performance of ADLs  - Assess for home care needs following discharge   - Consider OT consult to assist with ADL evaluation and planning for discharge  - Provide patient education as appropriate  Outcome: Progressing  Goal: Maintain or return mobility status to optimal level  Description: INTERVENTIONS:  - Assess patient's baseline mobility status (ambulation, transfers, stairs, etc )    - Identify cognitive and physical deficits and behaviors that affect mobility  - Identify mobility aids required to assist with transfers and/or ambulation (gait belt, sit-to-stand, lift, walker, cane, etc )  - [place] fall precautions as indicated by assessment  - Record patient progress and toleration of activity level on Mobility SBAR; progress patient to next Phase/Stage  - Instruct patient to call for assistance with activity based on assessment  - Consider rehabilitation consult to assist with strengthening/weightbearing, etc   Outcome: Progressing     Problem: DISCHARGE PLANNING  Goal: Discharge to home or other facility with appropriate resources  Description: INTERVENTIONS:  - Identify barriers to discharge w/patient and caregiver  - Arrange for needed discharge resources and transportation as appropriate  - Identify discharge learning needs (meds, wound care, etc )  - Arrange for interpretive services to assist at discharge as needed  - Refer to Case Management Department for coordinating discharge planning if the patient needs post-hospital services based on physician/advanced practitioner order or complex needs related to functional status, cognitive ability, or social support system  Outcome: Progressing     Problem: Knowledge Deficit  Goal: Patient/family/caregiver demonstrates understanding of disease process, treatment plan, medications, and discharge instructions  Description: Complete learning assessment and assess knowledge base  Interventions:  - Provide teaching at level of understanding  - Provide teaching via preferred learning methods  Outcome: Progressing     Problem: Potential for Falls  Goal: Patient will remain free of falls  Description: INTERVENTIONS:  - Assess patient frequently for physical needs  -  Identify cognitive and physical deficits and behaviors that affect risk of falls    -  [place] fall precautions as indicated by assessment   - Educate patient/family on patient safety including physical limitations  - Instruct patient to call for assistance with activity based on assessment  - Modify environment to reduce risk of injury  - Consider OT/PT consult to assist with strengthening/mobility  Outcome: Progressing

## 2021-02-11 NOTE — PROGRESS NOTES
Progress Note - Stacey Gonzalez 1955, 63 y o  male MRN: [id number]    Unit/Bed#: -01 Encounter: [id number]    Primary Care Provider: Aidan Villagran MD   Date and time admitted to hospital: 2/10/2021  2:07 PM        * Perforated sigmoid colon [facility])  Assessment & Plan  Present on admission, a/eb abdominal pain, ct scan findings in the setting of recurrent diverticulitis history  CT scan abdomen/pelvis - Findings consistent with acute, perforated sigmoid colon diverticulitis in the right lower abdomen  No evidence for pericolonic abscess  Multiple fat-containing ventral abdominal wall hernias seen on CT scan  Plan per surgery - is conservative mgt for now  NPO  IVFS  IV antibiotics  Pain control      Gastro-esophageal reflux disease without esophagitis  Assessment & Plan  Hold pantoprazole while NPO    Chronic obstructive pulmonary disease (HCC)  Assessment & Plan  History of COPD, not in acute exacerbation  Continue home inhalers    Essential hypertension  Assessment & Plan  Hold lisinopril  On 125mg metoprolol XL      BMI 33 0-33 9,adult  Assessment & Plan  Dietary and lifestyle counseling    YOLA on CPAP  Assessment & Plan  continue with CPAP while inpatient    Hyperlipidemia  Assessment & Plan  Hold home meds while NPO    Atherosclerosis of native coronary artery of native heart without angina pectoris  Assessment & Plan  History of CAD s/p stent placement (2018)  On aspirin and plavix  Plavix held for now        VTE Pharmacologic Prophylaxis:   Pharmacologic: Heparin  Mechanical VTE Prophylaxis in Place: Yes    Patient Centered Rounds: I have performed bedside rounds with nursing staff today  Discussions with Specialists or Other Care Team Provider: Cm    Education and Discussions with Family / Patient: Discussed with patient, defer family update to surgery    Time Spent for Care: 30 minutes  More than 50% of total time spent on counseling and coordination of care as described above      Current Length of Stay: 1 day(s)    Current Patient Status: Inpatient   Certification Statement: The patient will continue to require additional inpatient hospital stay due to as above    Discharge Plan: per primary team    Code Status: Level 1 - Full Code      Subjective:   NO overnight events, still with RLQ pain worse on exertion, or bending    Objective:     Vitals:   Temp (24hrs), Av 3 °F (37 4 °C), Min:97 5 °F (36 4 °C), Max:101 6 °F (38 7 °C)    Temp:  [97 5 °F (36 4 °C)-101 6 °F (38 7 °C)] 98 8 °F (37 1 °C)  HR:  [62-75] 73  Resp:  [15-20] 18  BP: (110-187)/(66-78) 110/66  SpO2:  [94 %-97 %] 95 %  Body mass index is 33 48 kg/m²  Input and Output Summary (last 24 hours): Intake/Output Summary (Last 24 hours) at 2021 0800  Last data filed at 2021 0555  Gross per 24 hour   Intake 3000 ml   Output 900 ml   Net 2100 ml       Physical Exam:     Physical Exam  Vitals signs and nursing note reviewed  Constitutional:       General: He is not in acute distress  Appearance: Normal appearance  He is not ill-appearing, toxic-appearing or diaphoretic  Cardiovascular:      Rate and Rhythm: Normal rate and regular rhythm  Pulses: Normal pulses  Heart sounds: No gallop  Pulmonary:      Effort: Pulmonary effort is normal  No respiratory distress  Breath sounds: Normal breath sounds  No stridor  No wheezing, rhonchi or rales  Chest:      Chest wall: No tenderness  Abdominal:      General: Bowel sounds are normal  There is distension (obese)  Palpations: Abdomen is soft  Tenderness: There is abdominal tenderness (RLQ)  There is no right CVA tenderness, left CVA tenderness or rebound  Musculoskeletal: Normal range of motion  General: No swelling, tenderness, deformity or signs of injury  Right lower leg: No edema  Left lower leg: No edema  Skin:     General: Skin is warm and dry  Capillary Refill: Capillary refill takes less than 2 seconds  Coloration: Skin is not jaundiced or pale  Findings: No bruising, erythema, lesion or rash  Neurological:      General: No focal deficit present  Mental Status: He is alert  Mental status is at baseline  Cranial Nerves: No cranial nerve deficit  Psychiatric:         Mood and Affect: Mood normal          Thought Content: Thought content normal        (Additional Data:     Labs:    Results from last 7 days   Lab Units 02/11/21  0503 02/10/21  1437   WBC Thousand/uL 9 96 12 57*   HEMOGLOBIN g/dL 13 7 15 9   HEMATOCRIT % 42 7 49 7*   PLATELETS Thousands/uL 170 219   NEUTROS PCT %  --  84*   LYMPHS PCT %  --  8*   MONOS PCT %  --  8   EOS PCT %  --  0     Results from last 7 days   Lab Units 02/11/21  0503   SODIUM mmol/L 136   POTASSIUM mmol/L 3 7   CHLORIDE mmol/L 102   CO2 mmol/L 25   BUN mg/dL 12   CREATININE mg/dL 1 02   ANION GAP mmol/L 9   CALCIUM mg/dL 8 1*   ALBUMIN g/dL 2 7*   TOTAL BILIRUBIN mg/dL 1 10*   ALK PHOS U/L 64   ALT U/L 22   AST U/L 15   GLUCOSE RANDOM mg/dL 115             Results from last 7 days   Lab Units 02/05/21  1214   HEMOGLOBIN A1C % 6 2*               * I Have Reviewed All Lab Data Listed Above  * Additional Pertinent Lab Tests Reviewed:  All Labs Within Last 24 Hours Reviewed    Imaging:    Imaging Reports Reviewed Today Include:   Imaging Personally Reviewed by Myself Includes:      Recent Cultures (last 7 days):           Last 24 Hours Medication List:   Current Facility-Administered Medications   Medication Dose Route Frequency Provider Last Rate    acetaminophen  650 mg Oral Q6H PRN Lior Booth PA-C      aspirin  81 mg Oral Daily Maryam Alanis MD      heparin (porcine)  5,000 Units Subcutaneous [facility] Maryam Alanis MD      HYDROmorphone  1 mg Intravenous Q3H PRN Maryam Alanis MD      melatonin  6 mg Oral HS Lior Booth PA-C      metoprolol succinate  125 mg Oral Daily Sonia Hanna MD      piperacillin-tazobactam  3 375 g Intravenous Q6H Anaid Stephen MD 3 375 g (02/11/21 0549)    And    metroNIDAZOLE  500 mg Intravenous Q8H Anaid Stephen  mg (02/11/21 0126)    ondansetron  4 mg Intravenous Q6H PRN Anaid Stephen MD      sodium chloride  125 mL/hr Intravenous Continuous Cammy Sanders PA-C 125 mL/hr (02/11/21 0555)    traZODone  100 mg Oral HS Joseph Burgos PA-C          Today, Patient Was Seen By: Oriana Qiu MD    ** Please Note: Dictation voice to text software may have been used in the creation of this document   **

## 2021-02-12 LAB
ANION GAP SERPL CALCULATED.3IONS-SCNC: 9 MMOL/L (ref 4–13)
BASOPHILS # BLD AUTO: 0.03 THOUSANDS/ΜL (ref 0–0.1)
BASOPHILS NFR BLD AUTO: 0 % (ref 0–1)
BUN SERPL-MCNC: 9 MG/DL (ref 5–25)
CALCIUM SERPL-MCNC: 8.4 MG/DL (ref 8.3–10.1)
CHLORIDE SERPL-SCNC: 103 MMOL/L (ref 100–108)
CO2 SERPL-SCNC: 23 MMOL/L (ref 21–32)
CREAT SERPL-MCNC: 1.01 MG/DL (ref 0.6–1.3)
EOSINOPHIL # BLD AUTO: 0.07 THOUSAND/ΜL (ref 0–0.61)
EOSINOPHIL NFR BLD AUTO: 1 % (ref 0–6)
ERYTHROCYTE [DISTWIDTH] IN BLOOD BY AUTOMATED COUNT: 13.4 % (ref 11.6–15.1)
GFR SERPL CREATININE-BSD FRML MDRD: 79 ML/MIN/1.73SQ M
GLUCOSE SERPL-MCNC: 123 MG/DL (ref 65–140)
HCT VFR BLD AUTO: 44.1 % (ref 36.5–49.3)
HGB BLD-MCNC: 14.4 G/DL (ref 12–17)
IMM GRANULOCYTES # BLD AUTO: 0.04 THOUSAND/UL (ref 0–0.2)
IMM GRANULOCYTES NFR BLD AUTO: 1 % (ref 0–2)
LYMPHOCYTES # BLD AUTO: 0.86 THOUSANDS/ΜL (ref 0.6–4.47)
LYMPHOCYTES NFR BLD AUTO: 11 % (ref 14–44)
MAGNESIUM SERPL-MCNC: 1.8 MG/DL (ref 1.6–2.6)
MCH RBC QN AUTO: 29 PG (ref 26.8–34.3)
MCHC RBC AUTO-ENTMCNC: 32.7 G/DL (ref 31.4–37.4)
MCV RBC AUTO: 89 FL (ref 82–98)
MONOCYTES # BLD AUTO: 0.88 THOUSAND/ΜL (ref 0.17–1.22)
MONOCYTES NFR BLD AUTO: 11 % (ref 4–12)
NEUTROPHILS # BLD AUTO: 6.2 THOUSANDS/ΜL (ref 1.85–7.62)
NEUTS SEG NFR BLD AUTO: 76 % (ref 43–75)
NRBC BLD AUTO-RTO: 0 /100 WBCS
PHOSPHATE SERPL-MCNC: 2.2 MG/DL (ref 2.3–4.1)
PLATELET # BLD AUTO: 158 THOUSANDS/UL (ref 149–390)
PMV BLD AUTO: 9.3 FL (ref 8.9–12.7)
POTASSIUM SERPL-SCNC: 3.6 MMOL/L (ref 3.5–5.3)
RBC # BLD AUTO: 4.97 MILLION/UL (ref 3.88–5.62)
SODIUM SERPL-SCNC: 135 MMOL/L (ref 136–145)
WBC # BLD AUTO: 8.08 THOUSAND/UL (ref 4.31–10.16)

## 2021-02-12 PROCEDURE — 80048 BASIC METABOLIC PNL TOTAL CA: CPT | Performed by: PHYSICIAN ASSISTANT

## 2021-02-12 PROCEDURE — 84100 ASSAY OF PHOSPHORUS: CPT | Performed by: PHYSICIAN ASSISTANT

## 2021-02-12 PROCEDURE — 85025 COMPLETE CBC W/AUTO DIFF WBC: CPT | Performed by: PHYSICIAN ASSISTANT

## 2021-02-12 PROCEDURE — 87493 C DIFF AMPLIFIED PROBE: CPT | Performed by: INTERNAL MEDICINE

## 2021-02-12 PROCEDURE — 99232 SBSQ HOSP IP/OBS MODERATE 35: CPT | Performed by: INTERNAL MEDICINE

## 2021-02-12 PROCEDURE — 99232 SBSQ HOSP IP/OBS MODERATE 35: CPT | Performed by: SURGERY

## 2021-02-12 PROCEDURE — 83735 ASSAY OF MAGNESIUM: CPT | Performed by: PHYSICIAN ASSISTANT

## 2021-02-12 PROCEDURE — C9113 INJ PANTOPRAZOLE SODIUM, VIA: HCPCS | Performed by: PHYSICIAN ASSISTANT

## 2021-02-12 RX ORDER — MAGNESIUM SULFATE 1 G/100ML
1 INJECTION INTRAVENOUS ONCE
Status: COMPLETED | OUTPATIENT
Start: 2021-02-12 | End: 2021-02-12

## 2021-02-12 RX ORDER — OXYCODONE HYDROCHLORIDE 5 MG/1
10 TABLET ORAL EVERY 4 HOURS PRN
Status: DISCONTINUED | OUTPATIENT
Start: 2021-02-12 | End: 2021-02-14 | Stop reason: HOSPADM

## 2021-02-12 RX ORDER — AMOXICILLIN AND CLAVULANATE POTASSIUM 875; 125 MG/1; MG/1
1 TABLET, FILM COATED ORAL EVERY 12 HOURS SCHEDULED
Qty: 20 TABLET | Refills: 0 | Status: SHIPPED | OUTPATIENT
Start: 2021-02-12 | End: 2021-02-22

## 2021-02-12 RX ORDER — ACETAMINOPHEN 325 MG/1
650 TABLET ORAL EVERY 6 HOURS PRN
Qty: 60 TABLET | Refills: 0
Start: 2021-02-12

## 2021-02-12 RX ORDER — OXYCODONE HYDROCHLORIDE 5 MG/1
5 TABLET ORAL EVERY 4 HOURS PRN
Status: DISCONTINUED | OUTPATIENT
Start: 2021-02-12 | End: 2021-02-14 | Stop reason: HOSPADM

## 2021-02-12 RX ORDER — LISINOPRIL 20 MG/1
20 TABLET ORAL DAILY
Status: DISCONTINUED | OUTPATIENT
Start: 2021-02-12 | End: 2021-02-14 | Stop reason: HOSPADM

## 2021-02-12 RX ORDER — SODIUM CHLORIDE, SODIUM GLUCONATE, SODIUM ACETATE, POTASSIUM CHLORIDE, MAGNESIUM CHLORIDE, SODIUM PHOSPHATE, DIBASIC, AND POTASSIUM PHOSPHATE .53; .5; .37; .037; .03; .012; .00082 G/100ML; G/100ML; G/100ML; G/100ML; G/100ML; G/100ML; G/100ML
75 INJECTION, SOLUTION INTRAVENOUS CONTINUOUS
Status: DISCONTINUED | OUTPATIENT
Start: 2021-02-12 | End: 2021-02-12

## 2021-02-12 RX ADMIN — METOPROLOL SUCCINATE 125 MG: 50 TABLET, EXTENDED RELEASE ORAL at 08:02

## 2021-02-12 RX ADMIN — HEPARIN SODIUM 5000 UNITS: 5000 INJECTION INTRAVENOUS; SUBCUTANEOUS at 05:50

## 2021-02-12 RX ADMIN — OXYCODONE HYDROCHLORIDE 10 MG: 5 TABLET ORAL at 11:15

## 2021-02-12 RX ADMIN — HEPARIN SODIUM 5000 UNITS: 5000 INJECTION INTRAVENOUS; SUBCUTANEOUS at 22:09

## 2021-02-12 RX ADMIN — HEPARIN SODIUM 5000 UNITS: 5000 INJECTION INTRAVENOUS; SUBCUTANEOUS at 14:48

## 2021-02-12 RX ADMIN — OXYCODONE HYDROCHLORIDE 10 MG: 5 TABLET ORAL at 15:54

## 2021-02-12 RX ADMIN — PIPERACILLIN AND TAZOBACTAM 3.38 G: 3; .375 INJECTION, POWDER, LYOPHILIZED, FOR SOLUTION INTRAVENOUS at 17:12

## 2021-02-12 RX ADMIN — PIPERACILLIN AND TAZOBACTAM 3.38 G: 3; .375 INJECTION, POWDER, LYOPHILIZED, FOR SOLUTION INTRAVENOUS at 05:50

## 2021-02-12 RX ADMIN — OXYCODONE HYDROCHLORIDE 10 MG: 5 TABLET ORAL at 19:53

## 2021-02-12 RX ADMIN — METRONIDAZOLE 500 MG: 500 INJECTION, SOLUTION INTRAVENOUS at 16:31

## 2021-02-12 RX ADMIN — PIPERACILLIN AND TAZOBACTAM 3.38 G: 3; .375 INJECTION, POWDER, LYOPHILIZED, FOR SOLUTION INTRAVENOUS at 11:05

## 2021-02-12 RX ADMIN — ASPIRIN 81 MG: 81 TABLET, COATED ORAL at 08:02

## 2021-02-12 RX ADMIN — METRONIDAZOLE 500 MG: 500 INJECTION, SOLUTION INTRAVENOUS at 08:01

## 2021-02-12 RX ADMIN — HYDROMORPHONE HYDROCHLORIDE 1 MG: 1 INJECTION, SOLUTION INTRAMUSCULAR; INTRAVENOUS; SUBCUTANEOUS at 07:58

## 2021-02-12 RX ADMIN — PANTOPRAZOLE SODIUM 40 MG: 40 INJECTION, POWDER, FOR SOLUTION INTRAVENOUS at 08:01

## 2021-02-12 RX ADMIN — TRAZODONE HYDROCHLORIDE 100 MG: 100 TABLET ORAL at 22:09

## 2021-02-12 RX ADMIN — Medication 2 TABLET: at 11:05

## 2021-02-12 RX ADMIN — LISINOPRIL 20 MG: 20 TABLET ORAL at 09:13

## 2021-02-12 RX ADMIN — PIPERACILLIN AND TAZOBACTAM 3.38 G: 3; .375 INJECTION, POWDER, LYOPHILIZED, FOR SOLUTION INTRAVENOUS at 22:09

## 2021-02-12 RX ADMIN — METRONIDAZOLE 500 MG: 500 INJECTION, SOLUTION INTRAVENOUS at 00:51

## 2021-02-12 RX ADMIN — MELATONIN 6 MG: at 22:09

## 2021-02-12 RX ADMIN — MAGNESIUM SULFATE HEPTAHYDRATE 1 G: 1 INJECTION, SOLUTION INTRAVENOUS at 09:13

## 2021-02-12 RX ADMIN — SODIUM CHLORIDE 125 ML/HR: 0.9 INJECTION, SOLUTION INTRAVENOUS at 05:50

## 2021-02-12 NOTE — DISCHARGE INSTRUCTIONS
Diverticulitis   WHAT YOU NEED TO KNOW:   Diverticulitis is a condition that causes small pockets along your intestine called diverticula to become inflamed or infected  This is caused by hard bowel movements, food, or bacteria that get stuck in the pockets  DISCHARGE INSTRUCTIONS:   Seek care immediately if:   · You have bowel movement or foul-smelling discharge leaking from your vagina or in your urine  · You have severe diarrhea  · You urinate less than usual or not at all  · You are not able to have a bowel movement  · You cannot stop vomiting  · You have severe abdominal pain, a fever, and your abdomen is larger than usual      · You have new or increased blood in your bowel movements  Contact your healthcare provider if:   · You have pain when you urinate  · Your symptoms get worse or do not go away  · You have questions or concerns about your condition or care  Medicines:   · Antibiotics  may be given to help prevent or treat a bacterial infection  · Prescription pain medicine  may be given  Ask your healthcare provider how to take this medicine safely  Some prescription pain medicines contain acetaminophen  Do not take other medicines that contain acetaminophen without talking to your healthcare provider  Too much acetaminophen may cause liver damage  Prescription pain medicine may cause constipation  Ask your healthcare provider how to prevent or treat constipation  · Take your medicine as directed  Contact your healthcare provider if you think your medicine is not helping or if you have side effects  Tell him or her if you are allergic to any medicine  Keep a list of the medicines, vitamins, and herbs you take  Include the amounts, and when and why you take them  Bring the list or the pill bottles to follow-up visits  Carry your medicine list with you in case of an emergency  Diet:  Follow low residue diet as directed for at least 2 weeks    Diet may be advanced to high-fiber diet after seen in office  Follow up with your healthcare provider as directed: You may need to return for a colonoscopy  When your symptoms are gone, you may need a low-fat, high-fiber diet to help prevent diverticulitis from developing again  Your healthcare provider or dietitian can help you create meal plans  Write down your questions so you remember to ask them during your visits  © Copyright [address] Information is for End User's use only and may not be sold, redistributed or otherwise used for commercial purposes  All illustrations and images included in CareNotes® are the copyrighted property of A D [employer] M [employer] Inc  or [address] [place], [place] [id number]   The above information is an  only  It is not intended as medical advice for individual conditions or treatments  Talk to your doctor, nurse or pharmacist before following any medical regimen to see if it is safe and effective for you

## 2021-02-12 NOTE — PROGRESS NOTES
Progress Note - Ashlie Second 1955, 63 y o  male MRN: [id number]    Unit/Bed#: -01 Encounter: [id number]    Primary Care Provider: Davi Lomeli MD   Date and time admitted to hospital: 2/10/2021  2:07 PM        * Perforated sigmoid colon [facility])  Assessment & Plan  Present on admission, a/eb abdominal pain, ct scan findings of acute perforated sigmoid colon diverticulitis   CT scan abdomen/pelvis - Findings consistent with acute, perforated sigmoid colon diverticulitis in the right lower abdomen  No evidence for pericolonic abscess  Multiple fat-containing ventral abdominal wall hernias seen on CT scan  Plan per surgery - is conservative mgt for now  Now on clear liquids  Can DC IVFs  Continue IV antibiotics  Pain control  Complained of multiple episodes of loose Stools - would screen for Cdiff    Gastro-esophageal reflux disease without esophagitis  Assessment & Plan  On pantoprozole    Chronic obstructive pulmonary disease (HCC)  Assessment & Plan  History of COPD, not in acute exacerbation  Continue home inhalers    Essential hypertension  Assessment & Plan  On 20mg lisinopril and 125mg metoprolol XL      BMI 33 0-33 9,adult  Assessment & Plan  Dietary and lifestyle counseling    YOLA on CPAP  Assessment & Plan  continue with CPAP while inpatient    Hyperlipidemia  Assessment & Plan  Resume home meds - no longer NPO    Atherosclerosis of native coronary artery of native heart without angina pectoris  Assessment & Plan  History of CAD s/p stent placement (2018)  On aspirin and plavix  Plavix held for now - per surgery recs      VTE Pharmacologic Prophylaxis:   Pharmacologic: Heparin  Mechanical VTE Prophylaxis in Place: Yes    Patient Centered Rounds: I have performed bedside rounds with nursing staff today      Discussions with Specialists or Other Care Team Provider: CM, surgery    Education and Discussions with Family / Patient: Discussed with patient, defer family updates to primary team    Time Spent for Care: 30 minutes  More than 50% of total time spent on counseling and coordination of care as described above  Current Length of Stay: 2 day(s)    Current Patient Status: Inpatient   Certification Statement: The patient will continue to require additional inpatient hospital stay due to as above    Discharge Plan: per surgery    Code Status: Level 1 - Full Code      Subjective:   Complained of loose stools overnight, abd pain reportedly the same- not much improvement    Objective:     Vitals:   Temp (24hrs), Av 2 °F (36 8 °C), Min:97 6 °F (36 4 °C), Max:98 6 °F (37 °C)    Temp:  [97 6 °F (36 4 °C)-98 6 °F (37 °C)] 97 6 °F (36 4 °C)  HR:  [52-63] 57  Resp:  [18] 18  BP: (127-154)/(66-75) 154/75  SpO2:  [95 %-96 %] 95 %  Body mass index is 33 1 kg/m²  Input and Output Summary (last 24 hours): Intake/Output Summary (Last 24 hours) at 2021 7042  Last data filed at 2021 2152  Gross per 24 hour   Intake --   Output 1050 ml   Net -1050 ml       Physical Exam:     Physical Exam  Vitals signs and nursing note reviewed  Constitutional:       General: He is not in acute distress  Appearance: Normal appearance  He is obese  He is not ill-appearing, toxic-appearing or diaphoretic  Cardiovascular:      Rate and Rhythm: Normal rate and regular rhythm  Pulses: Normal pulses  Heart sounds: No murmur  Pulmonary:      Effort: Pulmonary effort is normal  No respiratory distress  Breath sounds: Normal breath sounds  No stridor  No wheezing, rhonchi or rales  Chest:      Chest wall: No tenderness  Abdominal:      General: Bowel sounds are normal  There is distension  Palpations: Abdomen is soft  Tenderness: There is abdominal tenderness  There is no right CVA tenderness, left CVA tenderness, guarding or rebound  Musculoskeletal: Normal range of motion  General: No swelling or deformity  Right lower leg: No edema        Left lower leg: No edema  Skin:     General: Skin is warm and dry  Capillary Refill: Capillary refill takes less than 2 seconds  Coloration: Skin is not jaundiced or pale  Findings: No bruising, erythema, lesion or rash  Neurological:      General: No focal deficit present  Mental Status: He is alert  Mental status is at baseline  Cranial Nerves: No cranial nerve deficit  Psychiatric:         Mood and Affect: Mood normal          Thought Content: Thought content normal          Additional Data:     Labs:    Results from last 7 days   Lab Units 02/12/21  0540   WBC Thousand/uL 8 08   HEMOGLOBIN g/dL 14 4   HEMATOCRIT % 44 1   PLATELETS Thousands/uL 158   NEUTROS PCT % 76*   LYMPHS PCT % 11*   MONOS PCT % 11   EOS PCT % 1     Results from last 7 days   Lab Units 02/12/21  0540 02/11/21  0503   SODIUM mmol/L 135* 136   POTASSIUM mmol/L 3 6 3 7   CHLORIDE mmol/L 103 102   CO2 mmol/L 23 25   BUN mg/dL 9 12   CREATININE mg/dL 1 01 1 02   ANION GAP mmol/L 9 9   CALCIUM mg/dL 8 4 8 1*   ALBUMIN g/dL  --  2 7*   TOTAL BILIRUBIN mg/dL  --  1 10*   ALK PHOS U/L  --  64   ALT U/L  --  22   AST U/L  --  15   GLUCOSE RANDOM mg/dL 123 115             Results from last 7 days   Lab Units 02/05/21  1214   HEMOGLOBIN A1C % 6 2*               * I Have Reviewed All Lab Data Listed Above  * Additional Pertinent Lab Tests Reviewed:  All Labs Within Last 24 Hours Reviewed    Imaging:    Imaging Reports Reviewed Today Include:   Imaging Personally Reviewed by Myself Includes:      Recent Cultures (last 7 days):           Last 24 Hours Medication List:   Current Facility-Administered Medications   Medication Dose Route Frequency Provider Last Rate    acetaminophen  650 mg Oral Q6H PRN Tasneem Orta PA-C      aspirin  81 mg Oral Daily Ally Otero MD      heparin (porcine)  5,000 Units Subcutaneous [facility] Ally Otero MD      HYDROmorphone  1 mg Intravenous Q3H PRN Ally Otero MD      magnesium sulfate  1 g Intravenous Once Halie Nino MD      melatonin  6 mg Oral HS Artemio Love PA-C      metoprolol succinate  125 mg Oral Daily Halie Nino MD      piperacillin-tazobactam  3 375 g Intravenous Q6H Kaylie Noe MD 3 375 g (02/11/21 0549)    And    metroNIDAZOLE  500 mg Intravenous Q8H Kaylie Noe  mg (02/12/21 0801)    ondansetron  4 mg Intravenous Q6H PRN Kaylie Noe MD      pantoprazole  40 mg Intravenous Q24H [facility] Lou Laws PA-C      traZODone  100 mg Oral HS Artemio Love PA-C          Today, Patient Was Seen By: Halie Nino MD    ** Please Note: Dictation voice to text software may have been used in the creation of this document   **

## 2021-02-12 NOTE — PROGRESS NOTES
Progress Note - General Surgery   Edmund Leyden 63 y o  male MRN: [id number]  Unit/Bed#: -01 Encounter: [id number]    Assessment/plan  Acute perforated sigmoid colon diverticulitis, no abscess  - patient with improving abdominal pain, no fevers or chills, loose bowel movements overnight  - continue plan for conservative management  - leukocytosis resolved, VSS  -will add toast and crackers to clear liquids today,  if patient continues to improve will consider low residue diet for dinner  - will have Nutrition Services review low residue diet  For discharge  - continue IV antibiotics  - discharge when pain resolved, tolerating diet  -patient will require follow-up colonoscopy and  Elective sigmoid colon resection after 8-12 weeks  - patient will require outpatient medical and cardiac clearance prior to elective surgery  -DVT and GI prophylaxis       Ventral abdominal wall/ incisional hernias  - patient with multiple bulges of abdominal wall, but asymptomatic related to these hernias  - should be evaluated as an outpatient for possible repair after patient recovers from diverticulitis  - will not be able to repair hernias at same time as sigmoid colon resection    COPD on CPAP, GERD, HTN, Obesity, HLD  CAD, h/o prostate CA s/p RTx  - Plavix remains on hold, okay to continue at discharge,   Will need permission to hold for elective surgery  - history of pelvic radiation could make elective surgery more complicated  - continue home medications  - continue medical management    Subjective/Objective   Chief Complaint:  Abdominal pain    Subjective:  Patient with improving abdominal pain but still with some pain this morning  Did have multiple loose bowel movements overnight  Denies any fevers or chills  Tolerating clear liquids  No nausea or vomiting  Objective:    Blood pressure 154/75, pulse 57, temperature 97 6 °F (36 4 °C), resp   rate 18, height 6' 1" (1 854 m), weight 114 kg (250 lb 14 1 oz), SpO2 95 % ,Body mass index is 33 1 kg/m²  Intake/Output Summary (Last 24 hours) at 2/12/2021 0931  Last data filed at 2/11/2021 2152  Gross per 24 hour   Intake --   Output 1050 ml   Net -1050 ml       Invasive Devices     Peripheral Intravenous Line            Peripheral IV 02/11/21 Right Forearm 1 day                Physical Exam:   General appearance: alert and oriented, in no acute distress  Head: Normocephalic, without obvious abnormality, atraumatic, sclerae anicteric, mucous membranes moist  Neck: no JVD and supple, symmetrical, trachea midline  Lungs: clear to auscultation, no wheezes or rales  Heart:   Regular rate and rhythm, S1-S2 normal, no murmur  Abdomen:     Obese, soft, mild tenderness over pelvis and right lower quadrant, no rebound or guarding, few bowel sounds  Extremities:   No edema, redness or tenderness in the calves or thighs  Skin: Warm, dry  Nursing notes and vital signs reviewed      Lab, Imaging and other studies:  I have personally reviewed pertinent lab results    , CBC:   Lab Results   Component Value Date    WBC 8 08 02/12/2021    HGB 14 4 02/12/2021    HCT 44 1 02/12/2021    MCV 89 02/12/2021     02/12/2021    MCH 29 0 02/12/2021    MCHC 32 7 02/12/2021    RDW 13 4 02/12/2021    MPV 9 3 02/12/2021    NRBC 0 02/12/2021   , CMP:   Lab Results   Component Value Date    SODIUM 135 (L) 02/12/2021    K 3 6 02/12/2021     02/12/2021    CO2 23 02/12/2021    BUN 9 02/12/2021    CREATININE 1 01 02/12/2021    CALCIUM 8 4 02/12/2021    EGFR 79 02/12/2021     VTE Pharmacologic Prophylaxis: Heparin  VTE Mechanical Prophylaxis: sequential compression device     Susie Salmno PA-C

## 2021-02-12 NOTE — PLAN OF CARE
Problem: PAIN - ADULT  Goal: Verbalizes/displays adequate comfort level or baseline comfort level  Description: Interventions:  - Encourage patient to monitor pain and request assistance  - Assess pain using appropriate pain scale  - Administer analgesics based on type and severity of pain and evaluate response  - Implement non-pharmacological measures as appropriate and evaluate response  - Consider cultural and social influences on pain and pain management  - Notify physician/advanced practitioner if interventions unsuccessful or patient reports new pain  Outcome: Progressing     Problem: INFECTION - ADULT  Goal: Absence or prevention of progression during hospitalization  Description: INTERVENTIONS:  - Assess and monitor for signs and symptoms of infection  - Monitor lab/diagnostic results  - Monitor all insertion sites, i e  indwelling lines, tubes, and drains  - Monitor endotracheal if appropriate and nasal secretions for changes in amount and color  - [place] appropriate cooling/warming therapies per order  - Administer medications as ordered  - Instruct and encourage patient and family to use good hand hygiene technique  - Identify and instruct in appropriate isolation precautions for identified infection/condition  Outcome: Progressing  Goal: Absence of fever/infection during neutropenic period  Description: INTERVENTIONS:  - Monitor WBC    Outcome: Progressing     Problem: SAFETY ADULT  Goal: Patient will remain free of falls  Description: INTERVENTIONS:  - Assess patient frequently for physical needs  -  Identify cognitive and physical deficits and behaviors that affect risk of falls    -  [place] fall precautions as indicated by assessment   - Educate patient/family on patient safety including physical limitations  - Instruct patient to call for assistance with activity based on assessment  - Modify environment to reduce risk of injury  - Consider OT/PT consult to assist with strengthening/mobility  Outcome: Progressing  Goal: Maintain or return to baseline ADL function  Description: INTERVENTIONS:  -  Assess patient's ability to carry out ADLs; assess patient's baseline for ADL function and identify physical deficits which impact ability to perform ADLs (bathing, care of mouth/teeth, toileting, grooming, dressing, etc )  - Assess/evaluate cause of self-care deficits   - Assess range of motion  - Assess patient's mobility; develop plan if impaired  - Assess patient's need for assistive devices and provide as appropriate  - Encourage maximum independence but intervene and supervise when necessary  - Involve family in performance of ADLs  - Assess for home care needs following discharge   - Consider OT consult to assist with ADL evaluation and planning for discharge  - Provide patient education as appropriate  Outcome: Progressing  Goal: Maintain or return mobility status to optimal level  Description: INTERVENTIONS:  - Assess patient's baseline mobility status (ambulation, transfers, stairs, etc )    - Identify cognitive and physical deficits and behaviors that affect mobility  - Identify mobility aids required to assist with transfers and/or ambulation (gait belt, sit-to-stand, lift, walker, cane, etc )  - [place] fall precautions as indicated by assessment  - Record patient progress and toleration of activity level on Mobility SBAR; progress patient to next Phase/Stage  - Instruct patient to call for assistance with activity based on assessment  - Consider rehabilitation consult to assist with strengthening/weightbearing, etc   Outcome: Progressing     Problem: DISCHARGE PLANNING  Goal: Discharge to home or other facility with appropriate resources  Description: INTERVENTIONS:  - Identify barriers to discharge w/patient and caregiver  - Arrange for needed discharge resources and transportation as appropriate  - Identify discharge learning needs (meds, wound care, etc )  - Arrange for interpretive services to assist at discharge as needed  - Refer to Case Management Department for coordinating discharge planning if the patient needs post-hospital services based on physician/advanced practitioner order or complex needs related to functional status, cognitive ability, or social support system  Outcome: Progressing     Problem: Knowledge Deficit  Goal: Patient/family/caregiver demonstrates understanding of disease process, treatment plan, medications, and discharge instructions  Description: Complete learning assessment and assess knowledge base  Interventions:  - Provide teaching at level of understanding  - Provide teaching via preferred learning methods  Outcome: Progressing     Problem: Potential for Falls  Goal: Patient will remain free of falls  Description: INTERVENTIONS:  - Assess patient frequently for physical needs  -  Identify cognitive and physical deficits and behaviors that affect risk of falls    -  [employer] fall precautions as indicated by assessment   - Educate patient/family on patient safety including physical limitations  - Instruct patient to call for assistance with activity based on assessment  - Modify environment to reduce risk of injury  - Consider OT/PT consult to assist with strengthening/mobility  Outcome: Progressing     Problem: GASTROINTESTINAL - ADULT  Goal: Minimal or absence of nausea and/or vomiting  Description: INTERVENTIONS:  - Administer IV fluids if ordered to ensure adequate hydration  - Maintain NPO status until nausea and vomiting are resolved  - Nasogastric tube if ordered  - Administer ordered antiemetic medications as needed  - Provide nonpharmacologic comfort measures as appropriate  - Advance diet as tolerated, if ordered  - Consider nutrition services referral to assist patient with adequate nutrition and appropriate food choices  Outcome: Progressing  Goal: Maintains or returns to baseline bowel function  Description: INTERVENTIONS:  - Assess bowel function  - Encourage oral fluids to ensure adequate hydration  - Administer IV fluids if ordered to ensure adequate hydration  - Administer ordered medications as needed  - Encourage mobilization and activity  - Consider nutritional services referral to assist patient with adequate nutrition and appropriate food choices  Outcome: Progressing  Goal: Maintains adequate nutritional intake  Description: INTERVENTIONS:  - Monitor percentage of each meal consumed  - Identify factors contributing to decreased intake, treat as appropriate  - Assist with meals as needed  - Monitor I&O, weight, and lab values if indicated  - Obtain nutrition services referral as needed  Outcome: Progressing     Problem: METABOLIC, FLUID AND ELECTROLYTES - ADULT  Goal: Electrolytes maintained within normal limits  Description: INTERVENTIONS:  - Monitor labs and assess patient for signs and symptoms of electrolyte imbalances  - Administer electrolyte replacement as ordered  - Monitor response to electrolyte replacements, including repeat lab results as appropriate  - Instruct patient on fluid and nutrition as appropriate  Outcome: Progressing  Goal: Fluid balance maintained  Description: INTERVENTIONS:  - Monitor labs   - Monitor I/O and WT  - Instruct patient on fluid and nutrition as appropriate  - Assess for signs & symptoms of volume excess or deficit  Outcome: Progressing     Problem: SKIN/TISSUE INTEGRITY - ADULT  Goal: Skin integrity remains intact  Description: INTERVENTIONS  - Identify patients at risk for skin breakdown  - Assess and monitor skin integrity  - Assess and monitor nutrition and hydration status  - Monitor labs (i e  albumin)  - Assess for incontinence   - Turn and reposition patient  - Assist with mobility/ambulation  - Relieve pressure over bony prominences  - Avoid friction and shearing  - Provide appropriate hygiene as needed including keeping skin clean and dry  - Evaluate need for skin moisturizer/barrier cream  - Collaborate with interdisciplinary team (i e  Nutrition, Rehabilitation, etc )   - Patient/family teaching  Outcome: Progressing     Problem: HEMATOLOGIC - ADULT  Goal: Maintains hematologic stability  Description: INTERVENTIONS  - Assess for signs and symptoms of bleeding or hemorrhage  - Monitor labs  - Administer supportive blood products/factors as ordered and appropriate  Outcome: Progressing     Problem: MUSCULOSKELETAL - ADULT  Goal: Maintain or return mobility to safest level of function  Description: INTERVENTIONS:  - Assess patient's ability to carry out ADLs; assess patient's baseline for ADL function and identify physical deficits which impact ability to perform ADLs (bathing, care of mouth/teeth, toileting, grooming, dressing, etc )  - Assess/evaluate cause of self-care deficits   - Assess range of motion  - Assess patient's mobility  - Assess patient's need for assistive devices and provide as appropriate  - Encourage maximum independence but intervene and supervise when necessary  - Involve family in performance of ADLs  - Assess for home care needs following discharge   - Consider OT consult to assist with ADL evaluation and planning for discharge  - Provide patient education as appropriate  Outcome: Progressing     Problem: Nutrition/Hydration-ADULT  Goal: Nutrient/Hydration intake appropriate for improving, restoring or maintaining nutritional needs  Description: Monitor and assess patient's nutrition/hydration status for malnutrition  Collaborate with interdisciplinary team and initiate plan and interventions as ordered  Monitor patient's weight and dietary intake as ordered or per policy  Utilize nutrition screening tool and intervene as necessary  Determine patient's food preferences and provide high-protein, high-caloric foods as appropriate       INTERVENTIONS:  - Monitor oral intake, urinary output, labs, and treatment plans  - Assess nutrition and hydration status and recommend course of action  - Evaluate amount of meals eaten  - Assist patient with eating if necessary   - Allow adequate time for meals  - Recommend/ encourage appropriate diets, oral nutritional supplements, and vitamin/mineral supplements  - Order, calculate, and assess calorie counts as needed  - Recommend, monitor, and adjust tube feedings and TPN/PPN based on assessed needs  - Assess need for intravenous fluids  - Provide specific nutrition/hydration education as appropriate  - Include patient/family/caregiver in decisions related to nutrition  Outcome: Progressing

## 2021-02-12 NOTE — ASSESSMENT & PLAN NOTE
History of CAD s/p stent placement (2018)  On aspirin and plavix  Plavix held for now - per surgery recs

## 2021-02-12 NOTE — NUTRITION
02/12/21 1435   Nutrition Prognosis   Nutrition Considerations Ability to learn  (Reviewed low fiber/low residue diet and explained gradual progression to high fiber diet   Provided education materials and menus to assist with slowly increasing fiber intake)

## 2021-02-12 NOTE — ASSESSMENT & PLAN NOTE
Present on admission, a/eb abdominal pain, ct scan findings of acute perforated sigmoid colon diverticulitis   CT scan abdomen/pelvis - Findings consistent with acute, perforated sigmoid colon diverticulitis in the right lower abdomen  No evidence for pericolonic abscess   Multiple fat-containing ventral abdominal wall hernias seen on CT scan  Plan per surgery - is conservative mgt for now  Now on clear liquids  Can DC IVFs  Continue IV antibiotics  Pain control  Complained of multiple episodes of loose Stools - would screen for Cdiff

## 2021-02-13 LAB
ANION GAP SERPL CALCULATED.3IONS-SCNC: 9 MMOL/L (ref 4–13)
BASOPHILS # BLD AUTO: 0.04 THOUSANDS/ΜL (ref 0–0.1)
BASOPHILS # BLD AUTO: 0.05 THOUSANDS/ΜL (ref 0–0.1)
BASOPHILS NFR BLD AUTO: 1 % (ref 0–1)
BASOPHILS NFR BLD AUTO: 1 % (ref 0–1)
BUN SERPL-MCNC: 8 MG/DL (ref 5–25)
C DIFF TOX B TCDB STL QL NAA+PROBE: NEGATIVE
CALCIUM SERPL-MCNC: 8.2 MG/DL (ref 8.3–10.1)
CHLORIDE SERPL-SCNC: 103 MMOL/L (ref 100–108)
CO2 SERPL-SCNC: 24 MMOL/L (ref 21–32)
CREAT SERPL-MCNC: 0.95 MG/DL (ref 0.6–1.3)
EOSINOPHIL # BLD AUTO: 0.16 THOUSAND/ΜL (ref 0–0.61)
EOSINOPHIL # BLD AUTO: 0.18 THOUSAND/ΜL (ref 0–0.61)
EOSINOPHIL NFR BLD AUTO: 2 % (ref 0–6)
EOSINOPHIL NFR BLD AUTO: 3 % (ref 0–6)
ERYTHROCYTE [DISTWIDTH] IN BLOOD BY AUTOMATED COUNT: 13.3 % (ref 11.6–15.1)
ERYTHROCYTE [DISTWIDTH] IN BLOOD BY AUTOMATED COUNT: 13.5 % (ref 11.6–15.1)
GFR SERPL CREATININE-BSD FRML MDRD: 85 ML/MIN/1.73SQ M
GLUCOSE SERPL-MCNC: 113 MG/DL (ref 65–140)
HCT VFR BLD AUTO: 42.3 % (ref 36.5–49.3)
HCT VFR BLD AUTO: 45.1 % (ref 36.5–49.3)
HGB BLD-MCNC: 13.9 G/DL (ref 12–17)
HGB BLD-MCNC: 14.7 G/DL (ref 12–17)
IMM GRANULOCYTES # BLD AUTO: 0.04 THOUSAND/UL (ref 0–0.2)
IMM GRANULOCYTES # BLD AUTO: 0.05 THOUSAND/UL (ref 0–0.2)
IMM GRANULOCYTES NFR BLD AUTO: 1 % (ref 0–2)
IMM GRANULOCYTES NFR BLD AUTO: 1 % (ref 0–2)
LYMPHOCYTES # BLD AUTO: 0.82 THOUSANDS/ΜL (ref 0.6–4.47)
LYMPHOCYTES # BLD AUTO: 0.88 THOUSANDS/ΜL (ref 0.6–4.47)
LYMPHOCYTES NFR BLD AUTO: 11 % (ref 14–44)
LYMPHOCYTES NFR BLD AUTO: 13 % (ref 14–44)
MCH RBC QN AUTO: 29 PG (ref 26.8–34.3)
MCH RBC QN AUTO: 29.2 PG (ref 26.8–34.3)
MCHC RBC AUTO-ENTMCNC: 32.6 G/DL (ref 31.4–37.4)
MCHC RBC AUTO-ENTMCNC: 32.9 G/DL (ref 31.4–37.4)
MCV RBC AUTO: 89 FL (ref 82–98)
MCV RBC AUTO: 89 FL (ref 82–98)
MONOCYTES # BLD AUTO: 0.74 THOUSAND/ΜL (ref 0.17–1.22)
MONOCYTES # BLD AUTO: 0.93 THOUSAND/ΜL (ref 0.17–1.22)
MONOCYTES NFR BLD AUTO: 12 % (ref 4–12)
MONOCYTES NFR BLD AUTO: 12 % (ref 4–12)
NEUTROPHILS # BLD AUTO: 4.36 THOUSANDS/ΜL (ref 1.85–7.62)
NEUTROPHILS # BLD AUTO: 5.99 THOUSANDS/ΜL (ref 1.85–7.62)
NEUTS SEG NFR BLD AUTO: 70 % (ref 43–75)
NEUTS SEG NFR BLD AUTO: 73 % (ref 43–75)
NRBC BLD AUTO-RTO: 0 /100 WBCS
NRBC BLD AUTO-RTO: 0 /100 WBCS
PLATELET # BLD AUTO: 168 THOUSANDS/UL (ref 149–390)
PLATELET # BLD AUTO: 194 THOUSANDS/UL (ref 149–390)
PMV BLD AUTO: 10.1 FL (ref 8.9–12.7)
PMV BLD AUTO: 9.5 FL (ref 8.9–12.7)
POTASSIUM SERPL-SCNC: 3.3 MMOL/L (ref 3.5–5.3)
RBC # BLD AUTO: 4.76 MILLION/UL (ref 3.88–5.62)
RBC # BLD AUTO: 5.07 MILLION/UL (ref 3.88–5.62)
SODIUM SERPL-SCNC: 136 MMOL/L (ref 136–145)
WBC # BLD AUTO: 6.18 THOUSAND/UL (ref 4.31–10.16)
WBC # BLD AUTO: 8.06 THOUSAND/UL (ref 4.31–10.16)

## 2021-02-13 PROCEDURE — 85025 COMPLETE CBC W/AUTO DIFF WBC: CPT | Performed by: PHYSICIAN ASSISTANT

## 2021-02-13 PROCEDURE — 80048 BASIC METABOLIC PNL TOTAL CA: CPT | Performed by: PHYSICIAN ASSISTANT

## 2021-02-13 PROCEDURE — C9113 INJ PANTOPRAZOLE SODIUM, VIA: HCPCS | Performed by: PHYSICIAN ASSISTANT

## 2021-02-13 PROCEDURE — 99232 SBSQ HOSP IP/OBS MODERATE 35: CPT | Performed by: SURGERY

## 2021-02-13 PROCEDURE — 99232 SBSQ HOSP IP/OBS MODERATE 35: CPT | Performed by: INTERNAL MEDICINE

## 2021-02-13 RX ORDER — AMOXICILLIN AND CLAVULANATE POTASSIUM 875; 125 MG/1; MG/1
1 TABLET, FILM COATED ORAL EVERY 12 HOURS SCHEDULED
Status: DISCONTINUED | OUTPATIENT
Start: 2021-02-13 | End: 2021-02-14 | Stop reason: HOSPADM

## 2021-02-13 RX ORDER — SIMETHICONE 80 MG
40 TABLET,CHEWABLE ORAL EVERY 6 HOURS PRN
Status: DISCONTINUED | OUTPATIENT
Start: 2021-02-13 | End: 2021-02-14 | Stop reason: HOSPADM

## 2021-02-13 RX ORDER — POTASSIUM CHLORIDE 20 MEQ/1
40 TABLET, EXTENDED RELEASE ORAL ONCE
Status: COMPLETED | OUTPATIENT
Start: 2021-02-13 | End: 2021-02-13

## 2021-02-13 RX ADMIN — METRONIDAZOLE 500 MG: 500 INJECTION, SOLUTION INTRAVENOUS at 00:48

## 2021-02-13 RX ADMIN — AMOXICILLIN AND CLAVULANATE POTASSIUM 1 TABLET: 875; 125 TABLET, FILM COATED ORAL at 09:29

## 2021-02-13 RX ADMIN — AMOXICILLIN AND CLAVULANATE POTASSIUM 1 TABLET: 875; 125 TABLET, FILM COATED ORAL at 20:09

## 2021-02-13 RX ADMIN — METOPROLOL SUCCINATE 125 MG: 50 TABLET, EXTENDED RELEASE ORAL at 09:18

## 2021-02-13 RX ADMIN — HEPARIN SODIUM 5000 UNITS: 5000 INJECTION INTRAVENOUS; SUBCUTANEOUS at 05:34

## 2021-02-13 RX ADMIN — LISINOPRIL 20 MG: 20 TABLET ORAL at 09:19

## 2021-02-13 RX ADMIN — PIPERACILLIN AND TAZOBACTAM 3.38 G: 3; .375 INJECTION, POWDER, LYOPHILIZED, FOR SOLUTION INTRAVENOUS at 05:35

## 2021-02-13 RX ADMIN — POTASSIUM CHLORIDE 40 MEQ: 1500 TABLET, EXTENDED RELEASE ORAL at 09:40

## 2021-02-13 RX ADMIN — TRAZODONE HYDROCHLORIDE 100 MG: 100 TABLET ORAL at 22:21

## 2021-02-13 RX ADMIN — ASPIRIN 81 MG: 81 TABLET, COATED ORAL at 09:18

## 2021-02-13 RX ADMIN — OXYCODONE HYDROCHLORIDE 10 MG: 5 TABLET ORAL at 03:13

## 2021-02-13 RX ADMIN — SIMETHICONE 40 MG: 80 TABLET, CHEWABLE ORAL at 20:09

## 2021-02-13 RX ADMIN — MELATONIN 6 MG: at 22:21

## 2021-02-13 RX ADMIN — PANTOPRAZOLE SODIUM 40 MG: 40 INJECTION, POWDER, FOR SOLUTION INTRAVENOUS at 09:19

## 2021-02-13 NOTE — ASSESSMENT & PLAN NOTE
Present on admission, a/eb abdominal pain, ct scan findings of acute perforated sigmoid colon diverticulitis   CT scan abdomen/pelvis - Findings consistent with acute, perforated sigmoid colon diverticulitis in the right lower abdomen  No evidence for pericolonic abscess  Multiple fat-containing ventral abdominal wall hernias seen on CT scan  Plan per surgery - is conservative mgt for now  On IV antibiotics  Tolerating a surgical diet  Pain control  Complained of multiple episodes of loose Stools - Now resolved, Cdiff negative  Abdominal pain much improved

## 2021-02-13 NOTE — PLAN OF CARE
Problem: PAIN - ADULT  Goal: Verbalizes/displays adequate comfort level or baseline comfort level  Description: Interventions:  - Encourage patient to monitor pain and request assistance  - Assess pain using appropriate pain scale  - Administer analgesics based on type and severity of pain and evaluate response  - Implement non-pharmacological measures as appropriate and evaluate response  - Consider cultural and social influences on pain and pain management  - Notify physician/advanced practitioner if interventions unsuccessful or patient reports new pain  Outcome: Progressing     Problem: INFECTION - ADULT  Goal: Absence or prevention of progression during hospitalization  Description: INTERVENTIONS:  - Assess and monitor for signs and symptoms of infection  - Monitor lab/diagnostic results  - Monitor all insertion sites, i e  indwelling lines, tubes, and drains  - Monitor endotracheal if appropriate and nasal secretions for changes in amount and color  - [place] appropriate cooling/warming therapies per order  - Administer medications as ordered  - Instruct and encourage patient and family to use good hand hygiene technique  - Identify and instruct in appropriate isolation precautions for identified infection/condition  Outcome: Progressing  Goal: Absence of fever/infection during neutropenic period  Description: INTERVENTIONS:  - Monitor WBC    Outcome: Progressing     Problem: SAFETY ADULT  Goal: Patient will remain free of falls  Description: INTERVENTIONS:  - Assess patient frequently for physical needs  -  Identify cognitive and physical deficits and behaviors that affect risk of falls    -  [place] fall precautions as indicated by assessment   - Educate patient/family on patient safety including physical limitations  - Instruct patient to call for assistance with activity based on assessment  - Modify environment to reduce risk of injury  - Consider OT/PT consult to assist with strengthening/mobility  Outcome: Progressing  Goal: Maintain or return to baseline ADL function  Description: INTERVENTIONS:  -  Assess patient's ability to carry out ADLs; assess patient's baseline for ADL function and identify physical deficits which impact ability to perform ADLs (bathing, care of mouth/teeth, toileting, grooming, dressing, etc )  - Assess/evaluate cause of self-care deficits   - Assess range of motion  - Assess patient's mobility; develop plan if impaired  - Assess patient's need for assistive devices and provide as appropriate  - Encourage maximum independence but intervene and supervise when necessary  - Involve family in performance of ADLs  - Assess for home care needs following discharge   - Consider OT consult to assist with ADL evaluation and planning for discharge  - Provide patient education as appropriate  Outcome: Progressing  Goal: Maintain or return mobility status to optimal level  Description: INTERVENTIONS:  - Assess patient's baseline mobility status (ambulation, transfers, stairs, etc )    - Identify cognitive and physical deficits and behaviors that affect mobility  - Identify mobility aids required to assist with transfers and/or ambulation (gait belt, sit-to-stand, lift, walker, cane, etc )  - [place] fall precautions as indicated by assessment  - Record patient progress and toleration of activity level on Mobility SBAR; progress patient to next Phase/Stage  - Instruct patient to call for assistance with activity based on assessment  - Consider rehabilitation consult to assist with strengthening/weightbearing, etc   Outcome: Progressing     Problem: DISCHARGE PLANNING  Goal: Discharge to home or other facility with appropriate resources  Description: INTERVENTIONS:  - Identify barriers to discharge w/patient and caregiver  - Arrange for needed discharge resources and transportation as appropriate  - Identify discharge learning needs (meds, wound care, etc )  - Arrange for interpretive services to assist at discharge as needed  - Refer to Case Management Department for coordinating discharge planning if the patient needs post-hospital services based on physician/advanced practitioner order or complex needs related to functional status, cognitive ability, or social support system  Outcome: Progressing     Problem: Knowledge Deficit  Goal: Patient/family/caregiver demonstrates understanding of disease process, treatment plan, medications, and discharge instructions  Description: Complete learning assessment and assess knowledge base  Interventions:  - Provide teaching at level of understanding  - Provide teaching via preferred learning methods  Outcome: Progressing     Problem: Potential for Falls  Goal: Patient will remain free of falls  Description: INTERVENTIONS:  - Assess patient frequently for physical needs  -  Identify cognitive and physical deficits and behaviors that affect risk of falls    -  [place] fall precautions as indicated by assessment   - Educate patient/family on patient safety including physical limitations  - Instruct patient to call for assistance with activity based on assessment  - Modify environment to reduce risk of injury  - Consider OT/PT consult to assist with strengthening/mobility  Outcome: Progressing     Problem: GASTROINTESTINAL - ADULT  Goal: Minimal or absence of nausea and/or vomiting  Description: INTERVENTIONS:  - Administer IV fluids if ordered to ensure adequate hydration  - Maintain NPO status until nausea and vomiting are resolved  - Nasogastric tube if ordered  - Administer ordered antiemetic medications as needed  - Provide nonpharmacologic comfort measures as appropriate  - Advance diet as tolerated, if ordered  - Consider nutrition services referral to assist patient with adequate nutrition and appropriate food choices  Outcome: Progressing  Goal: Maintains or returns to baseline bowel function  Description: INTERVENTIONS:  - Assess bowel function  - Encourage oral fluids to ensure adequate hydration  - Administer IV fluids if ordered to ensure adequate hydration  - Administer ordered medications as needed  - Encourage mobilization and activity  - Consider nutritional services referral to assist patient with adequate nutrition and appropriate food choices  Outcome: Progressing  Goal: Maintains adequate nutritional intake  Description: INTERVENTIONS:  - Monitor percentage of each meal consumed  - Identify factors contributing to decreased intake, treat as appropriate  - Assist with meals as needed  - Monitor I&O, weight, and lab values if indicated  - Obtain nutrition services referral as needed  Outcome: Progressing     Problem: METABOLIC, FLUID AND ELECTROLYTES - ADULT  Goal: Electrolytes maintained within normal limits  Description: INTERVENTIONS:  - Monitor labs and assess patient for signs and symptoms of electrolyte imbalances  - Administer electrolyte replacement as ordered  - Monitor response to electrolyte replacements, including repeat lab results as appropriate  - Instruct patient on fluid and nutrition as appropriate  Outcome: Progressing  Goal: Fluid balance maintained  Description: INTERVENTIONS:  - Monitor labs   - Monitor I/O and WT  - Instruct patient on fluid and nutrition as appropriate  - Assess for signs & symptoms of volume excess or deficit  Outcome: Progressing     Problem: SKIN/TISSUE INTEGRITY - ADULT  Goal: Skin integrity remains intact  Description: INTERVENTIONS  - Identify patients at risk for skin breakdown  - Assess and monitor skin integrity  - Assess and monitor nutrition and hydration status  - Monitor labs (i e  albumin)  - Assess for incontinence   - Turn and reposition patient  - Assist with mobility/ambulation  - Relieve pressure over bony prominences  - Avoid friction and shearing  - Provide appropriate hygiene as needed including keeping skin clean and dry  - Evaluate need for skin moisturizer/barrier cream  - Collaborate with interdisciplinary team (i e  Nutrition, Rehabilitation, etc )   - Patient/family teaching  Outcome: Progressing     Problem: HEMATOLOGIC - ADULT  Goal: Maintains hematologic stability  Description: INTERVENTIONS  - Assess for signs and symptoms of bleeding or hemorrhage  - Monitor labs  - Administer supportive blood products/factors as ordered and appropriate  Outcome: Progressing     Problem: MUSCULOSKELETAL - ADULT  Goal: Maintain or return mobility to safest level of function  Description: INTERVENTIONS:  - Assess patient's ability to carry out ADLs; assess patient's baseline for ADL function and identify physical deficits which impact ability to perform ADLs (bathing, care of mouth/teeth, toileting, grooming, dressing, etc )  - Assess/evaluate cause of self-care deficits   - Assess range of motion  - Assess patient's mobility  - Assess patient's need for assistive devices and provide as appropriate  - Encourage maximum independence but intervene and supervise when necessary  - Involve family in performance of ADLs  - Assess for home care needs following discharge   - Consider OT consult to assist with ADL evaluation and planning for discharge  - Provide patient education as appropriate  Outcome: Progressing     Problem: Nutrition/Hydration-ADULT  Goal: Nutrient/Hydration intake appropriate for improving, restoring or maintaining nutritional needs  Description: Monitor and assess patient's nutrition/hydration status for malnutrition  Collaborate with interdisciplinary team and initiate plan and interventions as ordered  Monitor patient's weight and dietary intake as ordered or per policy  Utilize nutrition screening tool and intervene as necessary  Determine patient's food preferences and provide high-protein, high-caloric foods as appropriate       INTERVENTIONS:  - Monitor oral intake, urinary output, labs, and treatment plans  - Assess nutrition and hydration status and recommend course of action  - Evaluate amount of meals eaten  - Assist patient with eating if necessary   - Allow adequate time for meals  - Recommend/ encourage appropriate diets, oral nutritional supplements, and vitamin/mineral supplements  - Order, calculate, and assess calorie counts as needed  - Recommend, monitor, and adjust tube feedings and TPN/PPN based on assessed needs  - Assess need for intravenous fluids  - Provide specific nutrition/hydration education as appropriate  - Include patient/family/caregiver in decisions related to nutrition  Outcome: Progressing

## 2021-02-13 NOTE — PROGRESS NOTES
Pt stated, "I just passed a big clot in my stool " I asked my patient to leave his next Bm in the toilet  He denies lightheadedness but claims to "feel tired"  I notified Helio Henry PA-C  Upon the next BM, I noticed bright red blood in his toilet  I tiger texted Helio Henry  She explained that she will be putting new orders in  CBC at 1600, discontinue heparin, and labs in the morning  Discharge on hold until 2/14

## 2021-02-13 NOTE — PROGRESS NOTES
Progress Note - General Surgery   Salvador Song 63 y o  male MRN: [id number]  Unit/Bed#: -01 Encounter: [id number]    Assessment/Plan:  Acute Perforated sigmoid colon diverticulitis, no abscess  -some increased pain after eating  -still with multiple small loose stool  -C  Diff negative  -switch to po antibiotics and observe  -if pain and diarrhea improves possible d/c later today  -encourage ambulation and deep breathing   -discharge planning  -plan for outpatient colonoscopy and elective surgery  -preop medical and cardiac clearance      Ventral/incisional hernias  -multiple hernia  -will be evaluated as outpatient for possible repair    Comorbidities: COPD, GERD, HTN, Obesity, HLD, CAD, prostate CA  -Plavix remains on hold, ok to continue at discharge  -continue home medications  -medical management  -history of pelvic radiation could make elective surgery more complicated    Subjective/Objective   Chief Complaint: abdominal pain     Subjective: tolerated minimal diet, some continued abdominal pain and multiple small loose stool    Objective:     Blood pressure 135/72, pulse (!) 54, temperature 97 7 °F (36 5 °C), resp  rate 18, height 6' 1" (1 854 m), weight 112 kg (247 lb 14 4 oz), SpO2 96 %  ,Body mass index is 32 71 kg/m²      No intake or output data in the 24 hours ending 02/13/21 0918    Invasive Devices     Peripheral Intravenous Line            Peripheral IV 02/11/21 Right Forearm 1 day                Physical Exam: /72   Pulse 60   Temp 97 7 °F (36 5 °C)   Resp 18   Ht 6' 1" (1 854 m)   Wt 112 kg (247 lb 14 4 oz)   SpO2 96%   BMI 32 71 kg/m²   General appearance: alert and oriented, in no acute distress  Lungs: clear to auscultation bilaterally  Heart: regular rate and rhythm, S1, S2 normal, no murmur, click, rub or gallop  Abdomen: soft, protuberant, right lower quadrant abdominal pain , +BS  Extremities: extremities normal, warm and well-perfused; no cyanosis, clubbing, or edema    Lab, Imaging and other studies:  CBC:   Lab Results   Component Value Date    WBC 6 18 02/13/2021    HGB 13 9 02/13/2021    HCT 42 3 02/13/2021    MCV 89 02/13/2021     02/13/2021    MCH 29 2 02/13/2021    MCHC 32 9 02/13/2021    RDW 13 3 02/13/2021    MPV 9 5 02/13/2021    NRBC 0 02/13/2021   , CMP:   Lab Results   Component Value Date    SODIUM 136 02/13/2021    K 3 3 (L) 02/13/2021     02/13/2021    CO2 24 02/13/2021    BUN 8 02/13/2021    CREATININE 0 95 02/13/2021    CALCIUM 8 2 (L) 02/13/2021    EGFR 85 02/13/2021     VTE Pharmacologic Prophylaxis: Heparin  VTE Mechanical Prophylaxis: sequential compression device

## 2021-02-13 NOTE — PROGRESS NOTES
Progress Note - Benji Aguilar 1955, 63 y o  male MRN: [id number]    Unit/Bed#: -01 Encounter: [id number]    Primary Care Provider: Darren Jimenez MD   Date and time admitted to hospital: 2/10/2021  2:07 PM        * Perforated sigmoid colon [facility])  Assessment & Plan  Present on admission, a/eb abdominal pain, ct scan findings of acute perforated sigmoid colon diverticulitis   CT scan abdomen/pelvis - Findings consistent with acute, perforated sigmoid colon diverticulitis in the right lower abdomen  No evidence for pericolonic abscess  Multiple fat-containing ventral abdominal wall hernias seen on CT scan  Plan per surgery - is conservative mgt for now  On IV antibiotics  Tolerating a surgical diet  Pain control  Complained of multiple episodes of loose Stools - Now resolved, Cdiff negative  Abdominal pain much improved  Gastro-esophageal reflux disease without esophagitis  Assessment & Plan  On pantoprozole    Chronic obstructive pulmonary disease (HCC)  Assessment & Plan  History of COPD, not in acute exacerbation  Continue home inhalers    Essential hypertension  Assessment & Plan  On 20mg lisinopril and 125mg metoprolol XL      BMI 33 0-33 9,adult  Assessment & Plan  Dietary and lifestyle counseling    YOLA on CPAP  Assessment & Plan  continue with CPAP while inpatient    Hyperlipidemia  Assessment & Plan  Resume home meds    Atherosclerosis of native coronary artery of native heart without angina pectoris  Assessment & Plan  History of CAD s/p stent placement (2018)  On aspirin and plavix  Plavix held for now - per surgery recs      VTE Pharmacologic Prophylaxis:   Pharmacologic: Heparin  Mechanical VTE Prophylaxis in Place: Yes    Patient Centered Rounds: I have performed bedside rounds with nursing staff today      Discussions with Specialists or Other Care Team Provider:     Education and Discussions with Family / Patient: patient, defer family update to primary team    Time Spent for Care: 30 minutes  More than 50% of total time spent on counseling and coordination of care as described above  Current Length of Stay: 3 day(s)    Current Patient Status: Inpatient   Certification Statement: The patient will continue to require additional inpatient hospital stay due to as above    Discharge Plan: per surgery    Code Status: Level 1 - Full Code      Subjective:   No overnight events, diarrhea resolved, right lower quadrant abdominal pain much improved    Objective:     Vitals:   Temp (24hrs), Av °F (36 7 °C), Min:97 7 °F (36 5 °C), Max:98 4 °F (36 9 °C)    Temp:  [97 7 °F (36 5 °C)-98 4 °F (36 9 °C)] 97 7 °F (36 5 °C)  HR:  [54-59] 54  Resp:  [18] 18  BP: (135-147)/(71-72) 135/72  SpO2:  [96 %-97 %] 96 %  Body mass index is 32 71 kg/m²  Input and Output Summary (last 24 hours):     No intake or output data in the 24 hours ending 21 0915    Physical Exam:     Physical Exam  Vitals signs and nursing note reviewed  Constitutional:       General: He is not in acute distress  Appearance: He is obese  He is not ill-appearing, toxic-appearing or diaphoretic  Cardiovascular:      Rate and Rhythm: Normal rate and regular rhythm  Pulses: Normal pulses  Heart sounds: No murmur  No gallop  Pulmonary:      Effort: Pulmonary effort is normal  No respiratory distress  Breath sounds: Normal breath sounds  No stridor  No wheezing, rhonchi or rales  Chest:      Chest wall: No tenderness  Abdominal:      General: Bowel sounds are normal  There is no distension  Palpations: Abdomen is soft  Tenderness: There is abdominal tenderness (RLQ)  There is no right CVA tenderness, left CVA tenderness or guarding  Musculoskeletal: Normal range of motion  General: No swelling, tenderness, deformity or signs of injury  Right lower leg: No edema  Left lower leg: No edema  Skin:     General: Skin is warm and dry        Capillary Refill: Capillary refill takes less than 2 seconds  Coloration: Skin is not jaundiced or pale  Findings: No bruising, erythema, lesion or rash  Neurological:      General: No focal deficit present  Mental Status: He is alert and oriented to person, place, and time  Mental status is at baseline  Cranial Nerves: No cranial nerve deficit  Psychiatric:         Mood and Affect: Mood normal          Thought Content: Thought content normal          Judgment: Judgment normal            Additional Data:     Labs:    Results from last 7 days   Lab Units 02/13/21  0608   WBC Thousand/uL 6 18   HEMOGLOBIN g/dL 13 9   HEMATOCRIT % 42 3   PLATELETS Thousands/uL 168   NEUTROS PCT % 70   LYMPHS PCT % 13*   MONOS PCT % 12   EOS PCT % 3     Results from last 7 days   Lab Units 02/13/21  0608  02/11/21  0503   SODIUM mmol/L 136   < > 136   POTASSIUM mmol/L 3 3*   < > 3 7   CHLORIDE mmol/L 103   < > 102   CO2 mmol/L 24   < > 25   BUN mg/dL 8   < > 12   CREATININE mg/dL 0 95   < > 1 02   ANION GAP mmol/L 9   < > 9   CALCIUM mg/dL 8 2*   < > 8 1*   ALBUMIN g/dL  --   --  2 7*   TOTAL BILIRUBIN mg/dL  --   --  1 10*   ALK PHOS U/L  --   --  64   ALT U/L  --   --  22   AST U/L  --   --  15   GLUCOSE RANDOM mg/dL 113   < > 115    < > = values in this interval not displayed  * I Have Reviewed All Lab Data Listed Above  * Additional Pertinent Lab Tests Reviewed:  All Labs Within Last 24 Hours Reviewed    Imaging:    Imaging Reports Reviewed Today Include:   Imaging Personally Reviewed by Myself Includes:      Recent Cultures (last 7 days):     Results from last 7 days   Lab Units 02/12/21  1115   C DIFF TOXIN B BY PCR  Negative       Last 24 Hours Medication List:   Current Facility-Administered Medications   Medication Dose Route Frequency Provider Last Rate    acetaminophen  650 mg Oral Q6H PRN Dale Chaves PA-C      amoxicillin-clavulanate  1 tablet Oral Q12H [address] Christine Johnson PA-C      aspirin  81 mg Oral Daily Morteza Cervantes MD      heparin (porcine)  5,000 Units Subcutaneous [facility] Morteza Cervantes MD      HYDROmorphone  1 mg Intravenous Q3H PRN Morteza Cervantes MD      lisinopril  20 mg Oral Daily Edwin Diaz MD      melatonin  6 mg Oral HS Brandon Ybarra PA-C      metoprolol succinate  125 mg Oral Daily Edwin Diaz MD      ondansetron  4 mg Intravenous Q6H PRN Morteza Cervantes MD      oxyCODONE  10 mg Oral Q4H PRN Sydnee Salmon PA-C      oxyCODONE  5 mg Oral Q4H PRN Sydnee Salmon PA-C      pantoprazole  40 mg Intravenous Q24H [address] Lou A Eusebia, LULA      potassium chloride  40 mEq Oral Once Edwin Diaz MD      traZODone  100 mg Oral HS Brandon Ybarra PA-C          Today, Patient Was Seen By: Edwin Diaz MD    ** Please Note: Dictation voice to text software may have been used in the creation of this document   **

## 2021-02-14 VITALS
RESPIRATION RATE: 18 BRPM | SYSTOLIC BLOOD PRESSURE: 157 MMHG | TEMPERATURE: 97.7 F | OXYGEN SATURATION: 96 % | HEIGHT: 73 IN | BODY MASS INDEX: 32.11 KG/M2 | WEIGHT: 242.29 LBS | HEART RATE: 55 BPM | DIASTOLIC BLOOD PRESSURE: 74 MMHG

## 2021-02-14 LAB
ANION GAP SERPL CALCULATED.3IONS-SCNC: 11 MMOL/L (ref 4–13)
BASOPHILS # BLD AUTO: 0.04 THOUSANDS/ΜL (ref 0–0.1)
BASOPHILS NFR BLD AUTO: 1 % (ref 0–1)
BUN SERPL-MCNC: 7 MG/DL (ref 5–25)
CALCIUM SERPL-MCNC: 8.7 MG/DL (ref 8.3–10.1)
CHLORIDE SERPL-SCNC: 104 MMOL/L (ref 100–108)
CO2 SERPL-SCNC: 23 MMOL/L (ref 21–32)
CREAT SERPL-MCNC: 0.95 MG/DL (ref 0.6–1.3)
EOSINOPHIL # BLD AUTO: 0.18 THOUSAND/ΜL (ref 0–0.61)
EOSINOPHIL NFR BLD AUTO: 3 % (ref 0–6)
ERYTHROCYTE [DISTWIDTH] IN BLOOD BY AUTOMATED COUNT: 13.2 % (ref 11.6–15.1)
GFR SERPL CREATININE-BSD FRML MDRD: 85 ML/MIN/1.73SQ M
GLUCOSE SERPL-MCNC: 110 MG/DL (ref 65–140)
HCT VFR BLD AUTO: 44.8 % (ref 36.5–49.3)
HGB BLD-MCNC: 14.4 G/DL (ref 12–17)
IMM GRANULOCYTES # BLD AUTO: 0.04 THOUSAND/UL (ref 0–0.2)
IMM GRANULOCYTES NFR BLD AUTO: 1 % (ref 0–2)
LYMPHOCYTES # BLD AUTO: 0.88 THOUSANDS/ΜL (ref 0.6–4.47)
LYMPHOCYTES NFR BLD AUTO: 14 % (ref 14–44)
MCH RBC QN AUTO: 28.3 PG (ref 26.8–34.3)
MCHC RBC AUTO-ENTMCNC: 32.1 G/DL (ref 31.4–37.4)
MCV RBC AUTO: 88 FL (ref 82–98)
MONOCYTES # BLD AUTO: 0.82 THOUSAND/ΜL (ref 0.17–1.22)
MONOCYTES NFR BLD AUTO: 13 % (ref 4–12)
NEUTROPHILS # BLD AUTO: 4.48 THOUSANDS/ΜL (ref 1.85–7.62)
NEUTS SEG NFR BLD AUTO: 68 % (ref 43–75)
NRBC BLD AUTO-RTO: 0 /100 WBCS
PLATELET # BLD AUTO: 217 THOUSANDS/UL (ref 149–390)
PMV BLD AUTO: 9.5 FL (ref 8.9–12.7)
POTASSIUM SERPL-SCNC: 3.4 MMOL/L (ref 3.5–5.3)
RBC # BLD AUTO: 5.08 MILLION/UL (ref 3.88–5.62)
SODIUM SERPL-SCNC: 138 MMOL/L (ref 136–145)
WBC # BLD AUTO: 6.44 THOUSAND/UL (ref 4.31–10.16)

## 2021-02-14 PROCEDURE — 99238 HOSP IP/OBS DSCHRG MGMT 30/<: CPT | Performed by: PHYSICIAN ASSISTANT

## 2021-02-14 PROCEDURE — NC001 PR NO CHARGE: Performed by: PHYSICIAN ASSISTANT

## 2021-02-14 PROCEDURE — 80048 BASIC METABOLIC PNL TOTAL CA: CPT | Performed by: PHYSICIAN ASSISTANT

## 2021-02-14 PROCEDURE — 85025 COMPLETE CBC W/AUTO DIFF WBC: CPT | Performed by: PHYSICIAN ASSISTANT

## 2021-02-14 PROCEDURE — C9113 INJ PANTOPRAZOLE SODIUM, VIA: HCPCS | Performed by: PHYSICIAN ASSISTANT

## 2021-02-14 RX ADMIN — AMOXICILLIN AND CLAVULANATE POTASSIUM 1 TABLET: 875; 125 TABLET, FILM COATED ORAL at 08:12

## 2021-02-14 RX ADMIN — PANTOPRAZOLE SODIUM 40 MG: 40 INJECTION, POWDER, FOR SOLUTION INTRAVENOUS at 08:11

## 2021-02-14 RX ADMIN — ASPIRIN 81 MG: 81 TABLET, COATED ORAL at 08:12

## 2021-02-14 RX ADMIN — LISINOPRIL 20 MG: 20 TABLET ORAL at 08:16

## 2021-02-14 RX ADMIN — METOPROLOL SUCCINATE 125 MG: 50 TABLET, EXTENDED RELEASE ORAL at 08:15

## 2021-02-14 NOTE — PROGRESS NOTES
Progress Note - General Surgery   Tomi Mckee 63 y o  male MRN: [id number]  Unit/Bed#: -01 Encounter: [id number]    Assessment:  63year old male w/ COPD, GERD, HTN, HLD, CAD, prostate CA, stable ventral/incisional hernias, presenting with acute perforated sigmoid diverticulitis w/out abscess  Plan:    1  Acute sigmoid diverticulitis w/ perforation, without abscess  -Reports abd pain has improved significantly  Tolerating a diet w/out nausea or vomiting  -Abdomen is soft, non-tender to palpation   -Multiple bowel movements overnight  Reports 2 BM w/ BRBPR  Has had 2-3 since without blood   -Hgb remains stable without downward trend   -Plan for discharge today  -Augmentin x10 days   -F/u in office in 2 weeks   -Instructed to call/return to the ED with worsening pain, nausea, vomiting, fevers or continued bloody bowel movements  2  Ventral/incisional hernias  -Stable  -F/u as outpatient    3  Multiple comorbidities including COPD, GERD, HTN, HLD, CAD, prostate CA, Obesity   -OK to restart plavix at discharge   -Continue home medications  -Medical management per SLIM  -F/u as outpatient     Subjective/Objective     Subjective: Patient is doing well today  He reports that his pain has improved significantly  Had multiple bowel movements overnight  Reports that there was bright red blood in two of them  Reports that this has happened to him in the past after his radiation from prostate CA  Remains stable without lightheadedness or dizziness  Has had 2-3 bowel movements since without blood  Tolerating a diet without nausea or vomiting  Hgb remains stable without any downward trend  Objective:     Blood pressure 117/73, pulse 66, temperature 98 5 °F (36 9 °C), temperature source Axillary, resp  rate (!) 23, height 6' 1" (1 854 m), weight 110 kg (242 lb 4 6 oz), SpO2 95 %  ,Body mass index is 31 97 kg/m²      No intake or output data in the 24 hours ending 02/14/21 0809    Invasive Devices Peripheral Intravenous Line            Peripheral IV 02/11/21 Right Forearm 2 days                Physical Exam:   General:  Alert and oriented in no acute distress  HENT:  Normocephalic, atraumatic  Eyes:  Conjunctiva normal   No scleral icterus present  Neck:  Supple  Cardiovascular:  Regular rate and rhythm  No murmurs rubs or gallops appreciated  Pulmonary:  Normal effort  Clear to auscultation bilaterally  Abdomen:  Soft  Nondistended  Nontender to palpation  No rebound or guarding appreciated    Extremities:  Grossly normal   Neurologic:  Grossly normal     Lab, Imaging and other studies:  CBC:   Lab Results   Component Value Date    WBC 6 44 02/14/2021    HGB 14 4 02/14/2021    HCT 44 8 02/14/2021    MCV 88 02/14/2021     02/14/2021    MCH 28 3 02/14/2021    MCHC 32 1 02/14/2021    RDW 13 2 02/14/2021    MPV 9 5 02/14/2021    NRBC 0 02/14/2021   , CMP:   Lab Results   Component Value Date    SODIUM 138 02/14/2021    K 3 4 (L) 02/14/2021     02/14/2021    CO2 23 02/14/2021    BUN 7 02/14/2021    CREATININE 0 95 02/14/2021    CALCIUM 8 7 02/14/2021    EGFR 85 02/14/2021     VTE Pharmacologic Prophylaxis: Sequential compression device (Venodyne)   VTE Mechanical Prophylaxis: sequential compression device

## 2021-02-14 NOTE — DISCHARGE SUMMARY
General Surgery  Discharge Summary - Analilia Fleming 63 y o  male MRN: [id number]    Unit/Bed#: -01 Encounter: [id number]    Admission Date:   Admission Orders (From admission, onward)     Ordered        02/10/21 1654  INPATIENT ADMISSION  Once                     Admitting Diagnosis: Hepatic cyst [K76 89]  Bowel perforation ([address] ) [K63 1]  Abdominal pain [R10 9]  Acute diverticulitis of intestine [K57 92]    HPI:  82-year-old male who presented to the emergency department on 02/10/2021 with right lower quadrant abdominal pain  He reported that the pain started as right lower back pain however it then radiated to the right abdomen where it stayed  Reported that movement made the pain worse and did not have any relieving factors  Did have associated nausea without emesis  CT revealed acute complicated sigmoid diverticulitis  He was admitted under the General surgery service for evaluation and treatment  Procedures Performed: No orders of the defined types were placed in this encounter  Summary of Hospital Course:  Patient was admitted under the General surgery service on 02/10/2021  Patient was made NPO  IV fluids were started as well as IV antibiotics  Was determined that he would be monitored and treated conservatively without surgical intervention  His leukocytosis continued to improve however he continued to have pain  He continued to have diarrhea  C diff was checked  This was negative  He continued to clinically improve and his diet was advanced  He was transitioned to oral antibiotics on 02/13  He continued to have multiple bowel movements  He was noted to have 2 bowel movements with bright red blood per rectum  This had stopped spontaneously and he continued to have bowel movements without blood  His hemoglobin remained stable and he remained asymptomatic  He was then deemed appropriate for discharge on 02/14/2021  He was given p o   Antibiotics to take as an outpatient and instructed to return to the ED with worsening pain, fevers, chills or worsening or continued rectal bleeding  Patient in agreement with current plan and will follow up in the office in 2 weeks  Significant Findings, Care, Treatment and Services Provided:  As stated above    Complications:  None    Discharge Diagnosis:  Acute sigmoid diverticulitis with perforation, without abscess    Resolved Problems  Date Reviewed: 2/13/2021    None          Condition at Discharge: stable         Discharge instructions/Information to patient and family:   See after visit summary for information provided to patient and family  Provisions for Follow-Up Care:  See after visit summary for information related to follow-up care and any pertinent home health orders  PCP: Azalea Madsen MD    Disposition: See After Visit Summary for discharge disposition information  Planned Readmission: No      Discharge Statement   I spent 25 minutes discharging the patient  This time was spent on the day of discharge  I had direct contact with the patient on the day of discharge  Additional documentation is required if more than 30 minutes were spent on discharge  Discharge Medications:  See after visit summary for reconciled discharge medications provided to patient and family

## 2021-02-14 NOTE — PLAN OF CARE
Problem: PAIN - ADULT  Goal: Verbalizes/displays adequate comfort level or baseline comfort level  Description: Interventions:  - Encourage patient to monitor pain and request assistance  - Assess pain using appropriate pain scale  - Administer analgesics based on type and severity of pain and evaluate response  - Implement non-pharmacological measures as appropriate and evaluate response  - Consider cultural and social influences on pain and pain management  - Notify physician/advanced practitioner if interventions unsuccessful or patient reports new pain  Outcome: Progressing     Problem: INFECTION - ADULT  Goal: Absence or prevention of progression during hospitalization  Description: INTERVENTIONS:  - Assess and monitor for signs and symptoms of infection  - Monitor lab/diagnostic results  - Monitor all insertion sites, i e  indwelling lines, tubes, and drains  - Monitor endotracheal if appropriate and nasal secretions for changes in amount and color  - [place] appropriate cooling/warming therapies per order  - Administer medications as ordered  - Instruct and encourage patient and family to use good hand hygiene technique  - Identify and instruct in appropriate isolation precautions for identified infection/condition  Outcome: Progressing  Goal: Absence of fever/infection during neutropenic period  Description: INTERVENTIONS:  - Monitor WBC    Outcome: Progressing     Problem: SAFETY ADULT  Goal: Patient will remain free of falls  Description: INTERVENTIONS:  - Assess patient frequently for physical needs  -  Identify cognitive and physical deficits and behaviors that affect risk of falls    -  [place] fall precautions as indicated by assessment   - Educate patient/family on patient safety including physical limitations  - Instruct patient to call for assistance with activity based on assessment  - Modify environment to reduce risk of injury  - Consider OT/PT consult to assist with strengthening/mobility  Outcome: Progressing  Goal: Maintain or return to baseline ADL function  Description: INTERVENTIONS:  -  Assess patient's ability to carry out ADLs; assess patient's baseline for ADL function and identify physical deficits which impact ability to perform ADLs (bathing, care of mouth/teeth, toileting, grooming, dressing, etc )  - Assess/evaluate cause of self-care deficits   - Assess range of motion  - Assess patient's mobility; develop plan if impaired  - Assess patient's need for assistive devices and provide as appropriate  - Encourage maximum independence but intervene and supervise when necessary  - Involve family in performance of ADLs  - Assess for home care needs following discharge   - Consider OT consult to assist with ADL evaluation and planning for discharge  - Provide patient education as appropriate  Outcome: Progressing  Goal: Maintain or return mobility status to optimal level  Description: INTERVENTIONS:  - Assess patient's baseline mobility status (ambulation, transfers, stairs, etc )    - Identify cognitive and physical deficits and behaviors that affect mobility  - Identify mobility aids required to assist with transfers and/or ambulation (gait belt, sit-to-stand, lift, walker, cane, etc )  - [place] fall precautions as indicated by assessment  - Record patient progress and toleration of activity level on Mobility SBAR; progress patient to next Phase/Stage  - Instruct patient to call for assistance with activity based on assessment  - Consider rehabilitation consult to assist with strengthening/weightbearing, etc   Outcome: Progressing     Problem: DISCHARGE PLANNING  Goal: Discharge to home or other facility with appropriate resources  Description: INTERVENTIONS:  - Identify barriers to discharge w/patient and caregiver  - Arrange for needed discharge resources and transportation as appropriate  - Identify discharge learning needs (meds, wound care, etc )  - Arrange for interpretive services to assist at discharge as needed  - Refer to Case Management Department for coordinating discharge planning if the patient needs post-hospital services based on physician/advanced practitioner order or complex needs related to functional status, cognitive ability, or social support system  Outcome: Progressing     Problem: Knowledge Deficit  Goal: Patient/family/caregiver demonstrates understanding of disease process, treatment plan, medications, and discharge instructions  Description: Complete learning assessment and assess knowledge base  Interventions:  - Provide teaching at level of understanding  - Provide teaching via preferred learning methods  Outcome: Progressing     Problem: Potential for Falls  Goal: Patient will remain free of falls  Description: INTERVENTIONS:  - Assess patient frequently for physical needs  -  Identify cognitive and physical deficits and behaviors that affect risk of falls    -  [place] fall precautions as indicated by assessment   - Educate patient/family on patient safety including physical limitations  - Instruct patient to call for assistance with activity based on assessment  - Modify environment to reduce risk of injury  - Consider OT/PT consult to assist with strengthening/mobility  Outcome: Progressing     Problem: GASTROINTESTINAL - ADULT  Goal: Minimal or absence of nausea and/or vomiting  Description: INTERVENTIONS:  - Administer IV fluids if ordered to ensure adequate hydration  - Maintain NPO status until nausea and vomiting are resolved  - Nasogastric tube if ordered  - Administer ordered antiemetic medications as needed  - Provide nonpharmacologic comfort measures as appropriate  - Advance diet as tolerated, if ordered  - Consider nutrition services referral to assist patient with adequate nutrition and appropriate food choices  Outcome: Progressing  Goal: Maintains or returns to baseline bowel function  Description: INTERVENTIONS:  - Assess bowel function  - Encourage oral fluids to ensure adequate hydration  - Administer IV fluids if ordered to ensure adequate hydration  - Administer ordered medications as needed  - Encourage mobilization and activity  - Consider nutritional services referral to assist patient with adequate nutrition and appropriate food choices  Outcome: Progressing  Goal: Maintains adequate nutritional intake  Description: INTERVENTIONS:  - Monitor percentage of each meal consumed  - Identify factors contributing to decreased intake, treat as appropriate  - Assist with meals as needed  - Monitor I&O, weight, and lab values if indicated  - Obtain nutrition services referral as needed  Outcome: Progressing     Problem: METABOLIC, FLUID AND ELECTROLYTES - ADULT  Goal: Electrolytes maintained within normal limits  Description: INTERVENTIONS:  - Monitor labs and assess patient for signs and symptoms of electrolyte imbalances  - Administer electrolyte replacement as ordered  - Monitor response to electrolyte replacements, including repeat lab results as appropriate  - Instruct patient on fluid and nutrition as appropriate  Outcome: Progressing  Goal: Fluid balance maintained  Description: INTERVENTIONS:  - Monitor labs   - Monitor I/O and WT  - Instruct patient on fluid and nutrition as appropriate  - Assess for signs & symptoms of volume excess or deficit  Outcome: Progressing     Problem: SKIN/TISSUE INTEGRITY - ADULT  Goal: Skin integrity remains intact  Description: INTERVENTIONS  - Identify patients at risk for skin breakdown  - Assess and monitor skin integrity  - Assess and monitor nutrition and hydration status  - Monitor labs (i e  albumin)  - Assess for incontinence   - Turn and reposition patient  - Assist with mobility/ambulation  - Relieve pressure over bony prominences  - Avoid friction and shearing  - Provide appropriate hygiene as needed including keeping skin clean and dry  - Evaluate need for skin moisturizer/barrier cream  - Collaborate with interdisciplinary team (i e  Nutrition, Rehabilitation, etc )   - Patient/family teaching  Outcome: Progressing     Problem: HEMATOLOGIC - ADULT  Goal: Maintains hematologic stability  Description: INTERVENTIONS  - Assess for signs and symptoms of bleeding or hemorrhage  - Monitor labs  - Administer supportive blood products/factors as ordered and appropriate  Outcome: Progressing     Problem: MUSCULOSKELETAL - ADULT  Goal: Maintain or return mobility to safest level of function  Description: INTERVENTIONS:  - Assess patient's ability to carry out ADLs; assess patient's baseline for ADL function and identify physical deficits which impact ability to perform ADLs (bathing, care of mouth/teeth, toileting, grooming, dressing, etc )  - Assess/evaluate cause of self-care deficits   - Assess range of motion  - Assess patient's mobility  - Assess patient's need for assistive devices and provide as appropriate  - Encourage maximum independence but intervene and supervise when necessary  - Involve family in performance of ADLs  - Assess for home care needs following discharge   - Consider OT consult to assist with ADL evaluation and planning for discharge  - Provide patient education as appropriate  Outcome: Progressing     Problem: Nutrition/Hydration-ADULT  Goal: Nutrient/Hydration intake appropriate for improving, restoring or maintaining nutritional needs  Description: Monitor and assess patient's nutrition/hydration status for malnutrition  Collaborate with interdisciplinary team and initiate plan and interventions as ordered  Monitor patient's weight and dietary intake as ordered or per policy  Utilize nutrition screening tool and intervene as necessary  Determine patient's food preferences and provide high-protein, high-caloric foods as appropriate       INTERVENTIONS:  - Monitor oral intake, urinary output, labs, and treatment plans  - Assess nutrition and hydration status and recommend course of action  - Evaluate amount of meals eaten  - Assist patient with eating if necessary   - Allow adequate time for meals  - Recommend/ encourage appropriate diets, oral nutritional supplements, and vitamin/mineral supplements  - Order, calculate, and assess calorie counts as needed  - Recommend, monitor, and adjust tube feedings and TPN/PPN based on assessed needs  - Assess need for intravenous fluids  - Provide specific nutrition/hydration education as appropriate  - Include patient/family/caregiver in decisions related to nutrition  Outcome: Progressing

## 2021-02-25 ENCOUNTER — TELEPHONE (OUTPATIENT)
Dept: NEUROLOGY | Facility: CLINIC | Age: 66
End: 2021-02-25

## 2021-02-25 NOTE — TELEPHONE ENCOUNTER
Received call from Dr Gabby Villafuerte regarding disability forms that were completed and sent to [facility] for patient on 12/22/20  Would like to speak to Dr Jay Tejeda directly  Dr Gabby Villafuerte would like clarification on certain sections of the form and would like a return call to further discuss  Sent Dr Jay Tejeda TT making him aware of above      Cb#: Dr Gabby Villafuerte [phone], okay to leave message this is his direct line

## 2021-03-01 ENCOUNTER — OFFICE VISIT (OUTPATIENT)
Dept: SURGERY | Facility: HOSPITAL | Age: 66
End: 2021-03-01
Payer: COMMERCIAL

## 2021-03-01 VITALS
WEIGHT: 240 LBS | SYSTOLIC BLOOD PRESSURE: 125 MMHG | HEART RATE: 51 BPM | DIASTOLIC BLOOD PRESSURE: 81 MMHG | HEIGHT: 73 IN | TEMPERATURE: 97.2 F | RESPIRATION RATE: 16 BRPM | BODY MASS INDEX: 31.81 KG/M2

## 2021-03-01 DIAGNOSIS — K57.32 DIVERTICULITIS LARGE INTESTINE: Primary | ICD-10-CM

## 2021-03-01 PROCEDURE — 99212 OFFICE O/P EST SF 10 MIN: CPT | Performed by: SURGERY

## 2021-04-12 NOTE — PROGRESS NOTES
Assessment/Plan:   Benigno Aguilar is a 63 y o male who is here for Diverticulitis (New patient followup to  [address] [place] [address] admit from 2/10/21-2/14/21 for sigmoid diverticulitis with perforation  First hospitialization  Doing well presently  Last colonoscopy done 2 years ago at [facility] by Charlotte Gonsales  Patient has history of CVA with stent placement  On Plavix  )    Plan: Complicated diverticulitis - discussed conservative vs operative mgt, surgical approach, risks and benefits, patient is leaning towards laparoscopic sigmoid resection in 8-12 weeks, awaiting previous colonoscopy records    Preoperative Clearance: None    HPI:  Benigno Aguilar is a 63 y o male who was referred for evaluation of Diverticulitis (New patient followup to  [address] [place] [address] admit from 2/10/21-2/14/21 for sigmoid diverticulitis with perforation  First hospitialization  Doing well presently  Last colonoscopy done 2 years ago at [facility] by Charlotte Gonsales  Patient has history of CVA with stent placement  On Plavix  )    Currently no abd pain       ROS:  General ROS: negative  negative for - chills, fatigue, fever or night sweats, weight loss  Respiratory ROS: no cough, shortness of breath, or wheezing  Cardiovascular ROS: no chest pain or dyspnea on exertion  Genito-Urinary ROS: no dysuria, trouble voiding, or hematuria  Musculoskeletal ROS: negative for - gait disturbance, joint pain or muscle pain  Neurological ROS: no TIA or stroke symptoms  No abd sxs    [unfilled]  Metoprolol, Brilinta [ticagrelor], and Bee venom    Current Outpatient Medications:     aspirin 81 MG tablet, Take 81 mg by mouth daily, Disp: , Rfl:     clopidogrel (PLAVIX) 75 mg tablet, Take 1 tablet (75 mg total) by mouth daily, Disp: 30 tablet, Rfl: 0    colestipol (COLESTID) 1 g tablet, Take 1 tablet by mouth 2 (two) times a day, Disp: , Rfl:     Evolocumab (REPATHA SURECLICK SC), Inject under the skin Once every 2 weeks, Disp: , Rfl:   ezetimibe (ZETIA) 10 mg tablet, Take 10 mg by mouth daily, Disp: , Rfl:     isosorbide dinitrate (ISORDIL) 30 mg tablet, Take 30 mg by mouth daily, Disp: , Rfl:     lisinopril (ZESTRIL) 20 mg tablet, Take 20 mg by mouth daily at bedtime, Disp: , Rfl: 3    Melatonin 10 MG TABS, Take 2 tablets by mouth daily at bedtime, Disp: , Rfl:     metoprolol succinate (TOPROL XL) 100 mg 24 hr tablet, Take 100 mg by mouth daily, Disp: , Rfl:     omeprazole (PriLOSEC) 40 MG capsule, Take 40 mg by mouth daily, Disp: , Rfl:     pantoprazole (PROTONIX) 40 mg tablet, Take 40 mg by mouth daily, Disp: , Rfl:     TRELEGY ELLIPTA 100-62 5-25 MCG/INH inhaler, 1 puff daily, Disp: , Rfl: 11    acetaminophen (TYLENOL) 325 mg tablet, Take 2 tablets (650 mg total) by mouth every 6 (six) hours as needed for mild pain (Patient not taking: Reported on 3/1/2021), Disp: 60 tablet, Rfl: 0    metoprolol succinate (TOPROL-XL) 25 mg 24 hr tablet, Take 25 mg by mouth daily, Disp: , Rfl:     traZODone (DESYREL) 50 mg tablet, Take 2 tablets (100 mg total) by mouth daily at bedtime (Patient not taking: Reported on 3/1/2021), Disp: 60 tablet, Rfl: 4  Past Medical History:   Diagnosis Date    Arthritis     COPD (chronic obstructive pulmonary disease) (HCC)     GERD (gastroesophageal reflux disease)     [facility] ([facility] )     Hypertension     Lung disease     [facility]     Pseudobulbar affect 6/13/2019    Sleep apnea     Stroke ([facility] )     TIA (transient ischemic attack)      Past Surgical History:   Procedure Laterality Date    CAROTID STENT      CHOLECYSTECTOMY      HERNIA REPAIR       Family History   Problem Relation Age of Onset    Stroke Mother     Atrial fibrillation Mother     No Known Problems Father     Diabetes Maternal Grandmother     Heart disease Maternal Grandmother     Heart disease Maternal Grandfather       reports that he quit smoking about 2 years ago   He has never used smokeless tobacco  He reports previous alcohol use  He reports that he does not use drugs  Labs:   Lab Results   Component Value Date    WBC 6 44 02/14/2021    WBC 8 06 02/13/2021    WBC 6 18 02/13/2021    HGB 14 4 02/14/2021    HGB 14 7 02/13/2021    HGB 13 9 02/13/2021     02/14/2021     02/13/2021     02/13/2021     Lab Results   Component Value Date    ALT 22 02/11/2021    ALT 31 02/10/2021    AST 15 02/11/2021    AST 16 02/10/2021     This SmartLink has not been configured with any valid records  PHYSICAL EXAM  General Appearance:    Alert, cooperative, no distress,   Head:    Normocephalic without obvious abnormality   Eyes:    PERRL, conjunctiva/corneas clear, EOM's intact        Neck:   Supple, no adenopathy, no JVD   Back:     Symmetric, no spinal or CVA tenderness   Lungs:     Clear to auscultation bilaterally, no wheezing or rhonchi   Heart:    Regular rate and rhythm, S1 and S2 normal, no murmur   Abdomen:     Soft +BS ND NT   Extremities:   Extremities normal  No clubbing, cyanosis or edema   Psych:   Normal Affect, AOx3  Neurologic:  Skin:   CNII-XII intact  Strength symmetric, speech intact    Warm, dry, intact, no visible rashes or lesions         Physical Exam              Some portions of this record may have been generated with voice recognition software  There may be translation, syntax,  or grammatical errors  Occasional wrong word or "sound-a-like" substitutions may have occurred due to the inherent limitations of the voice recognition software  Read the chart carefully and recognize, using context, where substitutions may have occurred  If you have any questions, please contact the dictating provider for clarification or correction, as needed  This encounter has been coded by a non-certified coder

## 2021-04-28 ENCOUNTER — OFFICE VISIT (OUTPATIENT)
Dept: NEUROLOGY | Facility: CLINIC | Age: 66
End: 2021-04-28
Payer: COMMERCIAL

## 2021-04-28 VITALS
SYSTOLIC BLOOD PRESSURE: 128 MMHG | HEIGHT: 73 IN | DIASTOLIC BLOOD PRESSURE: 70 MMHG | BODY MASS INDEX: 32.62 KG/M2 | WEIGHT: 246.1 LBS | HEART RATE: 78 BPM

## 2021-04-28 DIAGNOSIS — Z99.89 OSA ON CPAP: ICD-10-CM

## 2021-04-28 DIAGNOSIS — H81.92 VESTIBULOPATHY OF LEFT EAR: ICD-10-CM

## 2021-04-28 DIAGNOSIS — G47.33 OSA ON CPAP: ICD-10-CM

## 2021-04-28 DIAGNOSIS — I10 ESSENTIAL HYPERTENSION: ICD-10-CM

## 2021-04-28 DIAGNOSIS — G47.09 OTHER INSOMNIA: ICD-10-CM

## 2021-04-28 DIAGNOSIS — E78.5 HYPERLIPIDEMIA, UNSPECIFIED HYPERLIPIDEMIA TYPE: ICD-10-CM

## 2021-04-28 DIAGNOSIS — Z86.73 HISTORY OF STROKE: Primary | ICD-10-CM

## 2021-04-28 PROCEDURE — 99215 OFFICE O/P EST HI 40 MIN: CPT | Performed by: PSYCHIATRY & NEUROLOGY

## 2021-04-28 RX ORDER — TRAZODONE HYDROCHLORIDE 50 MG/1
100 TABLET ORAL
Qty: 60 TABLET | Refills: 4 | Status: SHIPPED | OUTPATIENT
Start: 2021-04-28 | End: 2021-10-29 | Stop reason: SDUPTHER

## 2021-04-28 NOTE — PATIENT INSTRUCTIONS
Stroke:  Boris Caraballo presents for a follow-up evaluation with regard to his prior stroke  He has not had any new stroke-like symptoms  He takes his medications as prescribed  He has not had any falls or significant bleeding or bruising issues  His neurologic exam is excellent in the office today although he does have saccadic pursuits with a mildly positive head impulse test with head turn to the right and no clear evidence of neuropathy on my brief exam   That would suggest that his intermittent gait imbalance may be mechanical related to his leg/ hip with a contribution of a mild vestibulopathy   -for ongoing stroke prevention he should continue his current combination aspirin, Plavix, Repatha, and appropriate blood pressure and glycemic control     - we will defer to the good judgment of his primary care team for monitoring of his cholesterol panel and blood sugar numbers     -would like him to remain physically active in as much as he feels capable of doing so and to occasionally check his blood pressure outside of the doctor's office setting  He should be targeting a blood pressure of less than 130/80  -considering his history of atherosclerosis/ small-vessel ischemic disease and his dyslipidemia it would be prudent to check his carotid arteries for any significant plaque  I will request a Doppler ultrasound to be performed  - he has no neurologic restrictions with regard to his typical activities     - he has been using his CPAP at home which will also help to reduce his risk for additional stroke  Gait dysfunction: He reports that his balance issues are really more related to stumbling or tripping and having to recover his balance  He has evidence of mild difficulty in the vestibular system on my exam today  He could consider following up with an otolaryngologist for more formal vestibular testing and for a formal hearing test if he so desires        I will plan for him to return to the office to see me in 1 year but I would be happy to see him sooner if the need should arise  If he has any stroke-like symptoms such as sudden painless loss of vision or double vision, difficulty speaking or swallowing, vertigo / room spinning that does not quickly resolve, or weakness/ numbness affecting 1 side of the face or body he should proceed by ambulance to the nearest emergency room immediately

## 2021-04-28 NOTE — PROGRESS NOTES
Patient ID: Placido Sage is a 63 y o  male  Assessment/Plan:   Patient Instructions   Stroke:  Brittany Holman presents for a follow-up evaluation with regard to his prior stroke  He has not had any new stroke-like symptoms  He takes his medications as prescribed  He has not had any falls or significant bleeding or bruising issues  His neurologic exam is excellent in the office today although he does have saccadic pursuits with a mildly positive head impulse test with head turn to the right and no clear evidence of neuropathy on my brief exam   That would suggest that his intermittent gait imbalance may be mechanical related to his leg/ hip with a contribution of a mild vestibulopathy   -for ongoing stroke prevention he should continue his current combination aspirin, Plavix, Repatha, and appropriate blood pressure and glycemic control     - we will defer to the good judgment of his primary care team for monitoring of his cholesterol panel and blood sugar numbers     -would like him to remain physically active in as much as he feels capable of doing so and to occasionally check his blood pressure outside of the doctor's office setting  He should be targeting a blood pressure of less than 130/80  -considering his history of atherosclerosis/ small-vessel ischemic disease and his dyslipidemia it would be prudent to check his carotid arteries for any significant plaque  I will request a Doppler ultrasound to be performed  - he has no neurologic restrictions with regard to his typical activities     - he has been using his CPAP at home which will also help to reduce his risk for additional stroke  Gait dysfunction: He reports that his balance issues are really more related to stumbling or tripping and having to recover his balance  He has evidence of mild difficulty in the vestibular system on my exam today    He could consider following up with an otolaryngologist for more formal vestibular testing and for a formal hearing test if he so desires  I will plan for him to return to the office to see me in 1 year but I would be happy to see him sooner if the need should arise  If he has any stroke-like symptoms such as sudden painless loss of vision or double vision, difficulty speaking or swallowing, vertigo / room spinning that does not quickly resolve, or weakness/ numbness affecting 1 side of the face or body he should proceed by ambulance to the nearest emergency room immediately  No problem-specific Assessment & Plan notes found for this encounter  Diagnoses and all orders for this visit:    History of stroke  -     VAS carotid complete study; Future    Other insomnia  -     traZODone (DESYREL) 50 mg tablet; Take 2 tablets (100 mg total) by mouth daily at bedtime    YOLA on CPAP    Essential hypertension    Vestibulopathy of left ear    Hyperlipidemia, unspecified hyperlipidemia type  -     VAS carotid complete study; Future           Subjective:    LEYDI      Purnima King presents for a follow-up evaluation with regard to his prior stroke  He reports no new symptoms concerning for recurrent TIA or stroke  He takes his medications as prescribed  He did not endorse any bleeding or bruising issues  He continues on dual anti-platelet therapy by design  He continues to take Repatha but unfortunately his LDL cholesterol remains in the 130s  That having been said, the medication is likely helping to keep his cholesterol in a reasonable range  He continues to use his CPAP for treatment of his obstructive sleep apnea  He has not had his carotid arteries checked  Overall from a stroke standpoint he is doing reasonably well  With regard to his balance he continues to have difficulties but they only affect him when he is standing/walking  He has no difficulty when sitting or laying even if he rolls over in bed    He reports that it feels like he stumbles and has to catch his balance rather than stumbling because he has a loss of balance overall  Objective:    /70 (BP Location: Left arm, Patient Position: Sitting, Cuff Size: Standard)   Pulse 78   Ht 6' 1" (1 854 m)   Wt 112 kg (246 lb 1 6 oz)   BMI 32 47 kg/m²       Physical Exam    Neurological Exam      At the time of my examination he was awake, alert, and in no distress  He is a good historian with no dysarthria or aphasia  Cranial nerves 2-12 were symmetrically intact bilaterally  With leftward gaze at 30-45° he does have left horizontal beating nystagmus with a torsional component  Test of skew was negative  Head impulse test shows a corrective saccade with head turn from left to right  There is no drift  Finger-to-nose with eyes closed is intact suggesting preserved proprioceptive function, and Romberg sign is absent  With eyes open he has very minimal past-pointing and no tremor or significant ataxia  Motor testing reveals symmetric strength throughout the bilateral upper lower extremities  Sensation is symmetric to temperature and vibration in the bilateral upper and lower extremities  His gait was stable  Byrd and Rinne tests were normal     ROS:    Review of Systems   Constitutional: Negative  Negative for appetite change and fever  HENT: Negative  Negative for hearing loss, tinnitus, trouble swallowing and voice change  Eyes: Negative  Negative for photophobia and pain  Respiratory: Negative  Negative for shortness of breath  Cardiovascular: Negative  Negative for palpitations  Gastrointestinal: Negative  Negative for nausea and vomiting  Endocrine: Negative  Negative for cold intolerance  Genitourinary: Negative  Negative for dysuria, frequency and urgency  Musculoskeletal: Negative  Negative for myalgias and neck pain  Skin: Negative  Negative for rash  Allergic/Immunologic: Negative  Neurological: Negative    Negative for dizziness, tremors, seizures, syncope, facial asymmetry, speech difficulty, weakness, light-headedness, numbness and headaches  Hematological: Negative  Does not bruise/bleed easily  Psychiatric/Behavioral: Negative  Negative for confusion, hallucinations and sleep disturbance  All other systems reviewed and are negative  Reviewed ROS as entered by medical assistant        Past Medical History:   Diagnosis Date    Arthritis     COPD (chronic obstructive pulmonary disease) ([place] [address] )     Diverticulitis     GERD (gastroesophageal reflux disease)     Heart attack ([place] [address] )     Hypertension     Lung disease     Prostate cancer ([place] [address] )     Pseudobulbar affect 2019    Sleep apnea     Stroke ([place] [address] )     TIA (transient ischemic attack)        Past Surgical History:   Procedure Laterality Date    CAROTID STENT      CHOLECYSTECTOMY      HERNIA REPAIR         Social History     Socioeconomic History    Marital status: /Civil Union     Spouse name: None    Number of children: None    Years of education: None    Highest education level: None   Occupational History    None   Social Needs    Financial resource strain: None    Food insecurity     Worry: None     Inability: None    Transportation needs     Medical: None     Non-medical: None   Tobacco Use    Smoking status: Former Smoker     Quit date: 2018     Years since quittin 9    Smokeless tobacco: Never Used   Substance and Sexual Activity    Alcohol use: Not Currently     Comment: rarely    Drug use: Never    Sexual activity: None   Lifestyle    Physical activity     Days per week: None     Minutes per session: None    Stress: None   Relationships    Social connections     Talks on phone: None     Gets together: None     Attends Shinto service: None     Active member of club or organization: None     Attends meetings of clubs or organizations: None     Relationship status: None    Intimate partner violence     Fear of current or ex partner: None     Emotionally abused: None Physically abused: None     Forced sexual activity: None   Other Topics Concern    None   Social History Narrative    None       Family History   Problem Relation Age of Onset    Stroke Mother     Atrial fibrillation Mother     No Known Problems Father     Diabetes Maternal Grandmother     Heart disease Maternal Grandmother     Heart disease Maternal Grandfather          Current Outpatient Medications:     aspirin 81 MG tablet, Take 81 mg by mouth daily, Disp: , Rfl:     clopidogrel (PLAVIX) 75 mg tablet, Take 1 tablet (75 mg total) by mouth daily, Disp: 30 tablet, Rfl: 0    colestipol (COLESTID) 1 g tablet, Take 1 tablet by mouth 2 (two) times a day, Disp: , Rfl:     Evolocumab (REPATHA SURECLICK SC), Inject under the skin Once every 2 weeks, Disp: , Rfl:     ezetimibe (ZETIA) 10 mg tablet, Take 10 mg by mouth daily, Disp: , Rfl:     isosorbide dinitrate (ISORDIL) 30 mg tablet, Take 30 mg by mouth daily, Disp: , Rfl:     lisinopril (ZESTRIL) 20 mg tablet, Take 20 mg by mouth daily at bedtime, Disp: , Rfl: 3    Melatonin 10 MG TABS, Take 2 tablets by mouth daily at bedtime, Disp: , Rfl:     metoprolol succinate (TOPROL XL) 100 mg 24 hr tablet, Take 100 mg by mouth daily, Disp: , Rfl:     metoprolol succinate (TOPROL-XL) 25 mg 24 hr tablet, Take 25 mg by mouth daily, Disp: , Rfl:     pantoprazole (PROTONIX) 40 mg tablet, Take 40 mg by mouth daily, Disp: , Rfl:     traZODone (DESYREL) 50 mg tablet, Take 2 tablets (100 mg total) by mouth daily at bedtime, Disp: 60 tablet, Rfl: 4    TRELEGY ELLIPTA 100-62 5-25 MCG/INH inhaler, 1 puff daily, Disp: , Rfl: 11    acetaminophen (TYLENOL) 325 mg tablet, Take 2 tablets (650 mg total) by mouth every 6 (six) hours as needed for mild pain (Patient not taking: Reported on 3/1/2021), Disp: 60 tablet, Rfl: 0    omeprazole (PriLOSEC) 40 MG capsule, Take 40 mg by mouth daily, Disp: , Rfl:     Allergies   Allergen Reactions    Metoprolol Anaphylaxis, Itching and Other (See Comments)     Heart activity  At the ER 20 years ago he was given this medication and pt says "everyone came running and all the bells were going off" but he does not recall the reaction  bradycardia?, has been taking toprol  Patient unsure of allergy reaction      Brilinta [Ticagrelor]     Bee Venom Rash and Hives        Blood pressure 128/70, pulse 78, height 6' 1" (1 854 m), weight 112 kg (246 lb 1 6 oz)

## 2021-05-17 ENCOUNTER — TELEPHONE (OUTPATIENT)
Dept: NEUROLOGY | Facility: CLINIC | Age: 66
End: 2021-05-17

## 2021-05-17 NOTE — TELEPHONE ENCOUNTER
fyi    Pt called and states that Dr Geovanny Bearden ordered VAS carotid  This was scheduled on 5/19/21  He received a letter in the mail that his appt was cancelled  Spoke w/ Julio Cesar Alonso and states that PA and peer to peer has been denied

## 2021-05-24 ENCOUNTER — OFFICE VISIT (OUTPATIENT)
Dept: SURGERY | Facility: HOSPITAL | Age: 66
End: 2021-05-24
Payer: COMMERCIAL

## 2021-05-24 VITALS
SYSTOLIC BLOOD PRESSURE: 134 MMHG | WEIGHT: 248 LBS | RESPIRATION RATE: 16 BRPM | DIASTOLIC BLOOD PRESSURE: 79 MMHG | BODY MASS INDEX: 32.87 KG/M2 | HEIGHT: 73 IN | TEMPERATURE: 97.8 F | HEART RATE: 59 BPM

## 2021-05-24 DIAGNOSIS — K43.0 INCISIONAL HERNIA WITH OBSTRUCTION: ICD-10-CM

## 2021-05-24 DIAGNOSIS — K57.32 DIVERTICULITIS LARGE INTESTINE: Primary | ICD-10-CM

## 2021-05-24 PROCEDURE — 99213 OFFICE O/P EST LOW 20 MIN: CPT | Performed by: SURGERY

## 2021-05-24 NOTE — PROGRESS NOTES
Assessment/Plan:   Kimmy Mcmahon is a 63 y o male who is here for Diverticulitis (Established patient who returns for followup for his sigmoid diverticulitis with perforation  Was hospitialied at [address] [place] [address] 2/10/21-2/14/21  Doing well presently  No "attacks" Just has some bloating and gassy feeling  Also has "hernias" he would like to talk about )    Plan: Complicated diverticulitis with microperforation/Incisional hernias - obtained pt's previous colonoscopy records which shows diverticular disease throughout sigmoid, descending and transverse colon, discussed that given patient microperforation and extensive disease that sigmoid resection may reduce his risk of future complicated attacks it will likely have minimal impact to overall risk of diverticular attacks given there will be extensive residual disease, patient is leaning towards non operative mgt, discussed incisional hernia conservative vs operative mgt, surgical approaches, risks and benefits and patient is thinking about repair in late fall which I think is reasonable given fat containing hernias    Preoperative Clearance: None    HPI:  Kimmy Mcmahon is a 63 y o male who was referred for evaluation of Diverticulitis (Established patient who returns for followup for his sigmoid diverticulitis with perforation  Was hospitialied at [address] [place] [address] 2/10/21-2/14/21  Doing well presently  No "attacks" Just has some bloating and gassy feeling  Also has "hernias" he would like to talk about )    Currently no issues other than bloating and flatulence although this was present long before of his diverticular attack       ROS:  General ROS: negative  negative for - chills, fatigue, fever or night sweats, weight loss  Respiratory ROS: no cough, shortness of breath, or wheezing  Cardiovascular ROS: no chest pain or dyspnea on exertion  Genito-Urinary ROS: no dysuria, trouble voiding, or hematuria  Musculoskeletal ROS: negative for - gait disturbance, joint pain or muscle pain  Neurological ROS: no TIA or stroke symptoms  Bloating and flatulence    [unfilled]  Metoprolol, Brilinta [ticagrelor], and Bee venom    Current Outpatient Medications:     aspirin 81 MG tablet, Take 81 mg by mouth daily, Disp: , Rfl:     clopidogrel (PLAVIX) 75 mg tablet, Take 1 tablet (75 mg total) by mouth daily, Disp: 30 tablet, Rfl: 0    colestipol (COLESTID) 1 g tablet, Take 1 tablet by mouth 2 (two) times a day, Disp: , Rfl:     Evolocumab (REPATHA SURECLICK SC), Inject under the skin Once every 2 weeks, Disp: , Rfl:     ezetimibe (ZETIA) 10 mg tablet, Take 10 mg by mouth daily, Disp: , Rfl:     isosorbide dinitrate (ISORDIL) 30 mg tablet, Take 30 mg by mouth daily, Disp: , Rfl:     lisinopril (ZESTRIL) 20 mg tablet, Take 20 mg by mouth daily at bedtime, Disp: , Rfl: 3    Melatonin 10 MG TABS, Take 2 tablets by mouth daily at bedtime, Disp: , Rfl:     metoprolol succinate (TOPROL XL) 100 mg 24 hr tablet, Take 100 mg by mouth daily, Disp: , Rfl:     metoprolol succinate (TOPROL-XL) 25 mg 24 hr tablet, Take 25 mg by mouth daily, Disp: , Rfl:     omeprazole (PriLOSEC) 40 MG capsule, Take 40 mg by mouth daily, Disp: , Rfl:     pantoprazole (PROTONIX) 40 mg tablet, Take 40 mg by mouth daily, Disp: , Rfl:     traZODone (DESYREL) 50 mg tablet, Take 2 tablets (100 mg total) by mouth daily at bedtime, Disp: 60 tablet, Rfl: 4    TRELEGY ELLIPTA 100-62 5-25 MCG/INH inhaler, 1 puff daily, Disp: , Rfl: 11    acetaminophen (TYLENOL) 325 mg tablet, Take 2 tablets (650 mg total) by mouth every 6 (six) hours as needed for mild pain (Patient not taking: Reported on 3/1/2021), Disp: 60 tablet, Rfl: 0  Past Medical History:   Diagnosis Date    Arthritis     COPD (chronic obstructive pulmonary disease) (HCC)     Diverticulitis     GERD (gastroesophageal reflux disease)     [facility] [address] [facility]     Hypertension     Lung disease     [facility]     Pseudobulbar affect 6/13/2019  Sleep apnea     Stroke [facility] [address] [facility]     TIA (transient ischemic attack)      Past Surgical History:   Procedure Laterality Date    CAROTID STENT      CHOLECYSTECTOMY      HERNIA REPAIR       Family History   Problem Relation Age of Onset    Stroke Mother     Atrial fibrillation Mother     No Known Problems Father     Diabetes Maternal Grandmother     Heart disease Maternal Grandmother     Heart disease Maternal Grandfather       reports that he quit smoking about 3 years ago  He has never used smokeless tobacco  He reports previous alcohol use  He reports that he does not use drugs  Labs:   Lab Results   Component Value Date    WBC 6 44 02/14/2021    WBC 8 06 02/13/2021    WBC 6 18 02/13/2021    HGB 14 4 02/14/2021    HGB 14 7 02/13/2021    HGB 13 9 02/13/2021     02/14/2021     02/13/2021     02/13/2021     Lab Results   Component Value Date    ALT 22 02/11/2021    ALT 31 02/10/2021    AST 15 02/11/2021    AST 16 02/10/2021     This SmartLink has not been configured with any valid records  PHYSICAL EXAM  General Appearance:    Alert, cooperative, no distress,    Head:    Normocephalic without obvious abnormality   Eyes:    PERRL, conjunctiva/corneas clear, EOM's intact        Neck:   Supple, no adenopathy, no JVD   Back:     Symmetric, no spinal or CVA tenderness   Lungs:     Clear to auscultation bilaterally, no wheezing or rhonchi   Heart:    Regular rate and rhythm, S1 and S2 normal, no murmur   Abdomen:     Soft +BS ND NT + incisional hernia defects   Extremities:   Extremities normal  No clubbing, cyanosis or edema   Psych:   Normal Affect, AOx3  Neurologic:  Skin:   CNII-XII intact  Strength symmetric, speech intact    Warm, dry, intact, no visible rashes or lesions         Physical Exam              Some portions of this record may have been generated with voice recognition software  There may be translation, syntax,  or grammatical errors   Occasional wrong word or "sound-a-like" substitutions may have occurred due to the inherent limitations of the voice recognition software  Read the chart carefully and recognize, using context, where substitutions may have occurred  If you have any questions, please contact the dictating provider for clarification or correction, as needed  This encounter has been coded by a non-certified coder

## 2021-10-29 DIAGNOSIS — G47.09 OTHER INSOMNIA: ICD-10-CM

## 2021-10-29 RX ORDER — TRAZODONE HYDROCHLORIDE 50 MG/1
100 TABLET ORAL
Qty: 60 TABLET | Refills: 5 | Status: SHIPPED | OUTPATIENT
Start: 2021-10-29 | End: 2022-03-29 | Stop reason: SDUPTHER

## 2022-03-28 ENCOUNTER — TELEPHONE (OUTPATIENT)
Dept: NEUROLOGY | Facility: CLINIC | Age: 67
End: 2022-03-28

## 2022-03-28 DIAGNOSIS — G47.09 OTHER INSOMNIA: ICD-10-CM

## 2022-03-28 NOTE — TELEPHONE ENCOUNTER
LMOM for the patient to call us back to reschedule his appt on 4/25/22 at 2pm with Dr Reyes Buhl because he will not be in the office that day   If the patient calls back he can be scheduled with any AP/NP

## 2022-03-29 RX ORDER — TRAZODONE HYDROCHLORIDE 50 MG/1
100 TABLET ORAL
Qty: 60 TABLET | Refills: 5 | Status: SHIPPED | OUTPATIENT
Start: 2022-03-29

## 2022-03-29 NOTE — TELEPHONE ENCOUNTER
Called and spoke with the patient wife and she stated that she can not schedule the patients appt and that he will be calling later when he wakes up to reschedule   When he calls back please reschedule with a NP/AP

## 2022-03-29 NOTE — TELEPHONE ENCOUNTER
Patient called in to reschedule  I assisted him in rescheduling 4/20 with Romaine Guzmán  Confirmed his address and mailed him an appt card  Patient updated the phone numbers in the chart as well  Lastly, patient requested refill of trazodone 50 mg - 2 tabs at bedtime  Uses the [facility] [address] in [place]  Pended script for review for signature

## 2022-06-10 NOTE — TELEPHONE ENCOUNTER
Patient called back to reschedule his cancelled appt for 4/25/22 since Dr Celeste Jacobson was out of the office that day  RESCHEDULED: Thur, 9/8/22 at 1:00pm w/ Jack Torres in [facility]  Mailed him new appt reminder card      This appt will be the follow up from the history of stroke,  LOV: 4/28/21 w/ Dr Celeste Jacobson

## 2022-08-26 ENCOUNTER — TELEPHONE (OUTPATIENT)
Dept: NEUROLOGY | Facility: CLINIC | Age: 67
End: 2022-08-26

## 2022-08-26 NOTE — TELEPHONE ENCOUNTER
Pt left VM re: change in insurance and pharmacy  Changed pharmacy preference in system  New insurance info as follows:    [address]  ID: [id number]  Group #: [id number]    Insurance became effective &/1/22    Please change insurance info in chart  Thank you

## 2022-08-29 ENCOUNTER — TELEPHONE (OUTPATIENT)
Dept: NEUROLOGY | Facility: CLINIC | Age: 67
End: 2022-08-29

## 2022-08-29 NOTE — TELEPHONE ENCOUNTER
Called and left a voicemail for patient - Please call back to confirm upcoming appointment with [facility]  Provided patient with apt date, time and location  Informed patient that check in is at least 15 minutes prior to apt time    Mentioned pending VAS

## 2022-09-01 NOTE — TELEPHONE ENCOUNTER
Called patient a second time and confirmed the appointment   However the VAS never got done because its been so long so he is just going to come to this appointment and go from there

## 2022-09-08 ENCOUNTER — OFFICE VISIT (OUTPATIENT)
Dept: NEUROLOGY | Facility: CLINIC | Age: 67
End: 2022-09-08

## 2022-09-08 VITALS
HEART RATE: 46 BPM | TEMPERATURE: 98.1 F | SYSTOLIC BLOOD PRESSURE: 146 MMHG | DIASTOLIC BLOOD PRESSURE: 75 MMHG | BODY MASS INDEX: 35.36 KG/M2 | WEIGHT: 266.8 LBS | HEIGHT: 73 IN

## 2022-09-08 DIAGNOSIS — Z86.73 HISTORY OF STROKE: ICD-10-CM

## 2022-09-08 DIAGNOSIS — G47.09 OTHER INSOMNIA: ICD-10-CM

## 2022-09-08 DIAGNOSIS — E78.5 HYPERLIPIDEMIA: Primary | ICD-10-CM

## 2022-09-08 RX ORDER — NITROGLYCERIN 0.4 MG/1
0.4 TABLET SUBLINGUAL
COMMUNITY
Start: 2022-06-06

## 2022-09-08 RX ORDER — ISOSORBIDE MONONITRATE 30 MG/1
30 TABLET, EXTENDED RELEASE ORAL DAILY
COMMUNITY
Start: 2022-06-06

## 2022-09-08 RX ORDER — TRAZODONE HYDROCHLORIDE 100 MG/1
100 TABLET ORAL
Qty: 90 TABLET | Refills: 1 | Status: SHIPPED | OUTPATIENT
Start: 2022-09-08

## 2022-09-08 RX ORDER — LISINOPRIL 2.5 MG/1
2.5 TABLET ORAL EVERY EVENING
COMMUNITY
Start: 2022-08-31

## 2022-09-08 NOTE — PROGRESS NOTES
Patient ID: Vincent Wallace is a 74 y o  male  Assessment/Plan:  Patient Instructions:  Continue medications for stroke prevention- repatha, plavix  Continue with tazodone for sleep  Keep an eye on blood sugar  If any new stroke like symptoms go to ED       Diagnoses and all orders for this visit:    Hyperlipidemia  -     VAS carotid complete study; Future    Other insomnia  -     traZODone (DESYREL) 100 mg tablet; Take 1 tablet (100 mg total) by mouth daily at bedtime    History of stroke  -     VAS carotid complete study; Future  -pontine at [facility], 2019, was on brilinta initially but caused HR fluctuation per patient so switched to plavix and has been doing well since  Has had loop recorder since then that does not show atrial fibrillation  Last LDL 54 on repatha/zetia  Last A1c 6 3, plans on altering diet and having regular follow up with PCP  No recent carotid artery US; reordered today for monitoring  Other orders  -     lisinopril (ZESTRIL) 2 5 mg tablet; Take 2 5 mg by mouth every evening  -     nitroglycerin (NITROSTAT) 0 4 mg SL tablet; Place 0 4 mg under the tongue  -     isosorbide mononitrate (IMDUR) 30 mg 24 hr tablet; Take 30 mg by mouth daily        Subjective:    LEYDI Pena (lia) is a 74year old male with past medical history of YOLA, COPD, CVA, HTN, HLD, prostate cancer, insomnia, MI/CAD, OA, diverticulitis, hernia, abdominal aneurysm with repair who presents for routine stroke follow up  He states he has been feeling well; no new stroke like symptoms, denies any cardiac complaints  He does state he gets occasional left lower quadrant/groin pain at times; previously this was associated with gas but this has improved- he has followed with GI/general surgery in the past regarding his multiple hernias and states gets conflicting opinion on whether or not they should be repaired-mostly they do not hurt/bother him   He states previously described off balance feeling is better- occasionally he will still get fluid feeling in his ears- he was seen by ENT-no new treatment  He states feels like something is in his left eye; he states maybe a hair; he has been rubbing the eye; he denies any visual complaint otherwise; he has tried drops once  He denies bleeding complaints on current medications  He plans on getting the loop recorder removed  He has been sleeping better with 100mg trazodone nightly  No appetite complaints  Recent labs reviewed with him; his A1c is slightly elevated; he mentions going to work on diet to help bring this down  He has not been able to get repeat carotid US since last visit  The following portions of the patient's history were reviewed and updated as appropriate: allergies, current medications, past family history, past medical history, past social history, past surgical history and problem list          Objective:    Blood pressure 146/75, pulse (!) 46, temperature 98 1 °F (36 7 °C), temperature source Tympanic, height 6' 1" (1 854 m), weight 121 kg (266 lb 12 8 oz)  Repeat pulse 58   Physical Exam  Vitals reviewed  Constitutional:       General: He is not in acute distress  Appearance: He is obese  HENT:      Head: Normocephalic and atraumatic  Right Ear: Tympanic membrane and external ear normal       Left Ear: Tympanic membrane and external ear normal       Mouth/Throat:      Mouth: Mucous membranes are moist       Pharynx: Oropharynx is clear  Eyes:      General:         Right eye: No discharge  Left eye: No discharge  Extraocular Movements: Extraocular movements intact  Conjunctiva/sclera: Conjunctivae normal       Comments: Eye irritation to left eye, no obvious foreign body   Cardiovascular:      Rate and Rhythm: Regular rhythm  Bradycardia present  Pulmonary:      Effort: Pulmonary effort is normal       Breath sounds: Normal breath sounds  Abdominal:      General: There is no distension        Hernia: A hernia is present  Musculoskeletal:      Cervical back: Normal range of motion  Right lower leg: No edema  Left lower leg: No edema  Skin:     General: Skin is warm  Capillary Refill: Capillary refill takes less than 2 seconds  Findings: No rash  Neurological:      General: No focal deficit present  Mental Status: He is alert  Coordination: Romberg sign negative  Deep Tendon Reflexes: Strength normal    Psychiatric:         Mood and Affect: Mood normal          Speech: Speech normal          Neurological Exam  Mental Status  Alert  Oriented to person, place and time  Speech is normal  Language is fluent with no aphasia  Cranial Nerves  CN II: Vision test: Eye irritation to left eye, no obvious foreign body  Right visual acuity: counts fingers  Left visual acuity: counts fingers  CN III, IV, VI: Extraocular movements intact bilaterally  Right pupil: 3 mm  Round  Reactive to light  Left pupil: 3 mm  Round  Reactive to light  CN V: Facial sensation is normal   CN VII: Full and symmetric facial movement  CN VIII: Hearing is normal   CN IX, X: Palate elevates symmetrically  CN XI: Shoulder shrug strength is normal   CN XII: Tongue midline without atrophy or fasciculations  Motor  Normal muscle bulk throughout  Strength is 5/5 throughout all four extremities  Sensory  Light touch is normal in upper and lower extremities  Coordination  Right: Finger-to-nose normal Left: Finger-to-nose normal     Gait  Casual gait is normal including stance, stride, and arm swing  Romberg is absent  Able to rise from chair without using arms  ROS:    Review of Systems   Constitutional: Negative for chills and fever  HENT: Negative for ear pain and sore throat  Eyes: Negative for pain and visual disturbance  Respiratory: Negative for cough and shortness of breath  Cardiovascular: Negative for chest pain and palpitations     Gastrointestinal: Negative for abdominal pain and vomiting  Genitourinary: Negative for dysuria and hematuria  Musculoskeletal: Negative for arthralgias and back pain  Skin: Negative for color change and rash  Neurological: Negative for seizures and syncope  All other systems reviewed and are negative    ROS was reviewed and updated as appropriate

## 2022-09-08 NOTE — PATIENT INSTRUCTIONS
Continue medications for stroke prevention- repatha, plavix  Continue with tazodone for sleep  Keep an eye on blood sugar  If any new stroke like symptoms go to ED    Stroke   AMBULATORY CARE:   A stroke  happens when blood flow to part of the brain is interrupted  This can cause serious brain damage from a lack of oxygen  A stroke caused by a blood clot is called an ischemic stroke  A stroke caused by a burst or torn blood vessel is called an intracerebral hemorrhage, or a hemorrhagic stroke  When stroke symptoms go away completely within minutes to hours and do not cause damage, it is called a transient ischemic attack (TIA)  A TIA is a warning sign that you are at risk of soon having a stroke  Know the warning signs of a stroke: The words BE FAST can help you remember and recognize warning signs of a stroke:  B = Balance:  Sudden loss of balance    E = Eyes:  Loss of vision in one or both eyes    F = Face:  Face droops on one side    A = Arms:  Arm drops when both arms are raised    S = Speech:  Speech is slurred or sounds different    T = Time:  Time to get help immediately       Call your local emergency number (911 in the [address] [facility]) for any of the following: You have any of the following signs of a stroke:      Numbness or drooping on one side of your face     Weakness in an arm or leg    Confusion or difficulty speaking    Dizziness, a severe headache, or vision loss       You have a seizure  You have chest pain or shortness of breath  Seek care immediately if:   Your arm or leg feels warm, tender, and painful  It may look swollen and red  You have loss of balance or coordination  You have double vision or vision loss  You have unusual or heavy bleeding  Call your doctor or neurologist if:   Your blood sugar level or blood pressure is higher or lower than usual     You have trouble swallowing  You have trouble having a bowel movement or urinating      You have questions or concerns about your condition or care  Signs and symptoms  depend on the type of stroke you had and where it occurred:  Loss of consciousness    Loss of vision in one or both eyes    Sudden weakness or paralysis in your arm, leg, or face    Sudden trouble walking, speaking, or understanding words you hear or read    Vomiting or a severe headache    Treatment  depends on the type of stroke you had:  Medicines  may be given to prevent or break up blood clots, or help your blood clot more easily  You may also need medicines to treat high cholesterol, high blood pressure, or diabetes  Thrombolysis  is a procedure used to break apart clots in an artery  A catheter is guided into the artery until it is near the clot  Medicine is put through the catheter that will help break apart the clot  The clot may be pulled out of the artery  Surgery  may be used to remove a blood clot or to relieve pressure within your brain  You may also need surgery to remove plaque buildup from your carotid arteries  Recovery testing: Your healthcare provider will test your recovery 90 days (3 months) after your stroke  This may be done over the phone or in person  Your provider will ask how well you can do the activities you did before the stroke  He or she will also ask how well you can do your daily activities without help  Your provider may make recommendations for you based on your test  For example, you may need someone to help you walk safely  You may also need help with daily activities, such as getting dressed  Based on your answers, your provider may do this test again over time  Manage the effects of a stroke:   Go to stroke rehabilitation (rehab) if directed  Rehab is a program run by specialists who will help you recover abilities you may have lost  Specialists include physical, occupational, and speech therapists  Physical therapists help you gain strength or keep your balance   Occupational therapists teach you new ways to do daily activities  Your therapy may include movements for everyday activities  An example is being able to raise yourself from a chair  A speech therapist helps you improve your ability to talk and swallow  Make your home safe  Remove anything you might trip over  Tape electrical cords down  Keep paths clear throughout your home  Make sure your home is well lit  Put nonslip materials on surfaces that might be slippery  An example is your bathtub or shower floor  A cane or walker may help you keep your balance as you walk  Prevent another stroke:   Manage health conditions  A condition such as diabetes can increase your risk for a stroke  Control your blood sugar level if you have hyperglycemia or diabetes  Take your prescribed medicines and check your blood sugar level as directed  Check your blood pressure as directed  High blood pressure can increase your risk for a stroke  Follow your healthcare provider's directions for controlling your blood pressure  Do not use nicotine products or illegal drugs  Nicotine and other chemicals in cigarettes and cigars can cause blood vessel damage  Nicotine and illegal drugs both increase your risk for a stroke  Ask your healthcare provider for information if you currently smoke or use drugs and need help to quit  E- cigarettes or smokeless tobacco still contain nicotine  Talk to your healthcare provider before you use these products  Talk to your healthcare provider about alcohol  Alcohol can raise your blood pressure  The recommended limit is 2 drinks in a day for men and 1 drink in a day for women  Do not binge drink or save a week's worth of alcohol to drink in 1 or 2 days  Limit weekly amounts as directed by your provider  Eat a variety of healthy foods  Healthy foods include whole-grain breads, low-fat dairy products, beans, lean meats, and fish  Eat at least 5 servings of fruits and vegetables each day   Choose foods that are low in fat, cholesterol, salt, and sugar  Eat foods that are high in potassium, such as potatoes and bananas  A dietitian can help you create healthy meal plans  Maintain a healthy weight  Ask your healthcare provider how much you should weigh  Ask him or her to help you create a weight loss plan if you are overweight  He or she can help you create small goals if you have a lot of weight to lose  Exercise as directed  Exercise can lower your blood pressure, cholesterol, weight, and blood sugar levels  Healthcare providers will help you create exercise goals  They can also help you make a plan to reach your goals  For example, you can break exercise into 10 minute periods, 3 times in the day  Find an exercise that you enjoy  This will make it easier for you to reach your exercise goals  Manage stress  Stress can raise your blood pressure  Find new ways to relax, such as deep breathing or listening to music  What you need to know about depression after a stroke:  Talk to your healthcare provider if you have depression that continues or is getting worse  Your provider may be able to help treat your depression  Your provider can also recommend support groups for you to join  A support group is a place to talk with others who have had a stroke  It may also help to talk to friends and family members about how you are feeling  Tell your family and friends to let your healthcare provider know if they see any signs of depression:  Extreme sadness    Avoiding social interaction with family or friends    A lack of interest in things you once enjoyed    Irritability    Trouble sleeping    Low energy levels    A change in eating habits or sudden weight gain or loss    Follow up with your doctor or neurologist as directed: You may need to come in for regular tests of your brain function  Your medicines may also need to be checked  Write down your questions so you remember to ask them during your visits    For support and more information:   American Heart Association and American Stroke Association  [address] , [address] [facility]   Phone: 1- 681 - 857-5453  Web Address: https://www strong com/  Jhonatan Johnston 2022 Information is for End User's use only and may not be sold, redistributed or otherwise used for commercial purposes  All illustrations and images included in CareNotes® are the copyrighted property of [employer]  or [address] [place] [address]  The above information is an  only  It is not intended as medical advice for individual conditions or treatments  Talk to your doctor, nurse or pharmacist before following any medical regimen to see if it is safe and effective for you

## 2022-11-10 ENCOUNTER — TELEPHONE (OUTPATIENT)
Dept: NEUROLOGY | Facility: CLINIC | Age: 67
End: 2022-11-10

## 2022-11-10 NOTE — TELEPHONE ENCOUNTER
Received a medical record request from Dept of [facility]   Request was sent and received to medical record dept 11/10/22

## 2022-11-11 ENCOUNTER — TELEPHONE (OUTPATIENT)
Dept: NEUROLOGY | Facility: CLINIC | Age: 67
End: 2022-11-11

## 2022-11-11 NOTE — TELEPHONE ENCOUNTER
Received Medical Record Request from Dept of [facility]   Request was sent and received to MR Dept  11/11/22

## 2022-11-18 NOTE — TELEPHONE ENCOUNTER
Calling O [phone] option 1 to see if they received the request from [facility] for medical records  Was made aware they received the request on 11/8/22 and an invoice was sent to the [facility] on 11/9 for the records  All that is needed is the payment and the records can be released    Called patient to give an update, left message for return call to office     [facility] [phone], spoke with representative Rut Smart  She states they did receive notice that a fee is due but the [facility] does not pay for these fees  They are supposed to be sending a letter out to the patient to make him aware that there is a $25 fee from [id number] to release his records  Medical record request does not specify what particular information they are looking for to submit for his disability claim  If they are not looking for extensive history, we can fax the information to the [facility] ourselves   However, if they need extensive medical history, paying the fee for all records may be best      Awaiting call back from patient

## 2022-11-18 NOTE — TELEPHONE ENCOUNTER
Max Rashid has called again requesting a call back regarding his Veterans affairs form and stated these forms have to be received within 14 days  He also asked for a call back at [phone]      Thank you,     Kristy Steward

## 2022-11-18 NOTE — TELEPHONE ENCOUNTER
Patient returned call to office     Spoke with patient, made him aware of all previous information   I provided patient the number to [employer], he would be agreeable to pay fee if necessary    Patient will contact the office again if he needs anything further from the office

## 2023-11-24 ENCOUNTER — APPOINTMENT (EMERGENCY)
Dept: CT IMAGING | Facility: HOSPITAL | Age: 68
End: 2023-11-24
Payer: COMMERCIAL

## 2023-11-24 ENCOUNTER — HOSPITAL ENCOUNTER (EMERGENCY)
Facility: HOSPITAL | Age: 68
Discharge: HOME | End: 2023-11-24
Attending: EMERGENCY MEDICINE
Payer: COMMERCIAL

## 2023-11-24 VITALS
RESPIRATION RATE: 18 BRPM | OXYGEN SATURATION: 99 % | TEMPERATURE: 97.7 F | HEIGHT: 73 IN | SYSTOLIC BLOOD PRESSURE: 153 MMHG | WEIGHT: 270 LBS | HEART RATE: 60 BPM | DIASTOLIC BLOOD PRESSURE: 74 MMHG | BODY MASS INDEX: 35.78 KG/M2

## 2023-11-24 DIAGNOSIS — K57.92 DIVERTICULITIS: Primary | ICD-10-CM

## 2023-11-24 DIAGNOSIS — I77.819 ECTATIC AORTA (HCC): ICD-10-CM

## 2023-11-24 DIAGNOSIS — K46.9 ABDOMINAL HERNIA: ICD-10-CM

## 2023-11-24 LAB
ALBUMIN SERPL BCP-MCNC: 4.2 G/DL (ref 3.5–5)
ALP SERPL-CCNC: 94 U/L (ref 34–104)
ALT SERPL W P-5'-P-CCNC: 26 U/L (ref 7–52)
ANION GAP SERPL CALCULATED.3IONS-SCNC: 8 MMOL/L
AST SERPL W P-5'-P-CCNC: 15 U/L (ref 13–39)
BACTERIA UR QL AUTO: NORMAL /HPF
BASOPHILS # BLD AUTO: 0.06 THOUSANDS/ÂΜL (ref 0–0.1)
BASOPHILS NFR BLD AUTO: 1 % (ref 0–1)
BILIRUB SERPL-MCNC: 0.72 MG/DL (ref 0.2–1)
BILIRUB UR QL STRIP: NEGATIVE
BUN SERPL-MCNC: 13 MG/DL (ref 5–25)
CALCIUM SERPL-MCNC: 9.6 MG/DL (ref 8.4–10.2)
CHLORIDE SERPL-SCNC: 100 MMOL/L (ref 96–108)
CLARITY UR: CLEAR
CO2 SERPL-SCNC: 27 MMOL/L (ref 21–32)
COLOR UR: YELLOW
CREAT SERPL-MCNC: 1.17 MG/DL (ref 0.6–1.3)
EOSINOPHIL # BLD AUTO: 0.16 THOUSAND/ÂΜL (ref 0–0.61)
EOSINOPHIL NFR BLD AUTO: 1 % (ref 0–6)
ERYTHROCYTE [DISTWIDTH] IN BLOOD BY AUTOMATED COUNT: 13.4 % (ref 11.6–15.1)
GFR SERPL CREATININE-BSD FRML MDRD: 64 ML/MIN/1.73SQ M
GLUCOSE SERPL-MCNC: 101 MG/DL (ref 65–140)
GLUCOSE UR STRIP-MCNC: NEGATIVE MG/DL
HCT VFR BLD AUTO: 46.4 % (ref 36.5–49.3)
HGB BLD-MCNC: 15.2 G/DL (ref 12–17)
HGB UR QL STRIP.AUTO: NEGATIVE
IMM GRANULOCYTES # BLD AUTO: 0.12 THOUSAND/UL (ref 0–0.2)
IMM GRANULOCYTES NFR BLD AUTO: 1 % (ref 0–2)
KETONES UR STRIP-MCNC: NEGATIVE MG/DL
LEUKOCYTE ESTERASE UR QL STRIP: NEGATIVE
LIPASE SERPL-CCNC: 27 U/L (ref 11–82)
LYMPHOCYTES # BLD AUTO: 1.53 THOUSANDS/ÂΜL (ref 0.6–4.47)
LYMPHOCYTES NFR BLD AUTO: 12 % (ref 14–44)
MCH RBC QN AUTO: 29.5 PG (ref 26.8–34.3)
MCHC RBC AUTO-ENTMCNC: 32.8 G/DL (ref 31.4–37.4)
MCV RBC AUTO: 90 FL (ref 82–98)
MONOCYTES # BLD AUTO: 1.25 THOUSAND/ÂΜL (ref 0.17–1.22)
MONOCYTES NFR BLD AUTO: 10 % (ref 4–12)
NEUTROPHILS # BLD AUTO: 9.72 THOUSANDS/ÂΜL (ref 1.85–7.62)
NEUTS SEG NFR BLD AUTO: 75 % (ref 43–75)
NITRITE UR QL STRIP: NEGATIVE
NON-SQ EPI CELLS URNS QL MICRO: NORMAL /HPF
NRBC BLD AUTO-RTO: 0 /100 WBCS
PH UR STRIP.AUTO: 6.5 [PH]
PLATELET # BLD AUTO: 199 THOUSANDS/UL (ref 149–390)
PMV BLD AUTO: 9 FL (ref 8.9–12.7)
POTASSIUM SERPL-SCNC: 4.2 MMOL/L (ref 3.5–5.3)
PROT SERPL-MCNC: 7.7 G/DL (ref 6.4–8.4)
PROT UR STRIP-MCNC: ABNORMAL MG/DL
RBC # BLD AUTO: 5.16 MILLION/UL (ref 3.88–5.62)
RBC #/AREA URNS AUTO: NORMAL /HPF
SODIUM SERPL-SCNC: 135 MMOL/L (ref 135–147)
SP GR UR STRIP.AUTO: <1.005 (ref 1–1.03)
UROBILINOGEN UR STRIP-ACNC: <2 MG/DL
WBC # BLD AUTO: 12.84 THOUSAND/UL (ref 4.31–10.16)
WBC #/AREA URNS AUTO: NORMAL /HPF

## 2023-11-24 PROCEDURE — 81001 URINALYSIS AUTO W/SCOPE: CPT | Performed by: EMERGENCY MEDICINE

## 2023-11-24 PROCEDURE — 85025 COMPLETE CBC W/AUTO DIFF WBC: CPT | Performed by: EMERGENCY MEDICINE

## 2023-11-24 PROCEDURE — 83690 ASSAY OF LIPASE: CPT | Performed by: EMERGENCY MEDICINE

## 2023-11-24 PROCEDURE — G1004 CDSM NDSC: HCPCS

## 2023-11-24 PROCEDURE — 74177 CT ABD & PELVIS W/CONTRAST: CPT

## 2023-11-24 PROCEDURE — 36415 COLL VENOUS BLD VENIPUNCTURE: CPT | Performed by: EMERGENCY MEDICINE

## 2023-11-24 PROCEDURE — 96374 THER/PROPH/DIAG INJ IV PUSH: CPT

## 2023-11-24 PROCEDURE — 99285 EMERGENCY DEPT VISIT HI MDM: CPT | Performed by: EMERGENCY MEDICINE

## 2023-11-24 PROCEDURE — 99284 EMERGENCY DEPT VISIT MOD MDM: CPT

## 2023-11-24 PROCEDURE — 80053 COMPREHEN METABOLIC PANEL: CPT | Performed by: EMERGENCY MEDICINE

## 2023-11-24 RX ORDER — AMOXICILLIN AND CLAVULANATE POTASSIUM 875; 125 MG/1; MG/1
1 TABLET, FILM COATED ORAL EVERY 12 HOURS
Qty: 14 TABLET | Refills: 0 | Status: SHIPPED | OUTPATIENT
Start: 2023-11-24 | End: 2023-12-01

## 2023-11-24 RX ORDER — ACETAMINOPHEN 325 MG/1
975 TABLET ORAL ONCE
Status: COMPLETED | OUTPATIENT
Start: 2023-11-24 | End: 2023-11-24

## 2023-11-24 RX ORDER — MORPHINE SULFATE 4 MG/ML
4 INJECTION, SOLUTION INTRAMUSCULAR; INTRAVENOUS ONCE
Status: COMPLETED | OUTPATIENT
Start: 2023-11-24 | End: 2023-11-24

## 2023-11-24 RX ADMIN — IOHEXOL 100 ML: 350 INJECTION, SOLUTION INTRAVENOUS at 16:04

## 2023-11-24 RX ADMIN — ACETAMINOPHEN 975 MG: 325 TABLET, FILM COATED ORAL at 15:40

## 2023-11-24 RX ADMIN — MORPHINE SULFATE 4 MG: 4 INJECTION INTRAVENOUS at 16:36

## 2023-11-24 NOTE — ED PROVIDER NOTES
History  Chief Complaint   Patient presents with    Abdominal Pain     Pt to er with reports of having diverticulitis and having a flare up. Was last hospitalized in . Denies n/v/d     Patient is a 79year old male who presents with LLQ abdominal pain that started at 3 am, constant, non-radiating, mild initially and progressively worsening to now moderate, associated with 4 episodes of loose stools. Feels like prior episodes of diverticulitis. Prior to Admission Medications   Prescriptions Last Dose Informant Patient Reported? Taking?    Evolocumab (REPATHA SURECLICK SC)   Yes No   Sig: Inject under the skin Once every 2 weeks   Melatonin 10 MG TABS   Yes No   Sig: Take 2 tablets by mouth daily at bedtime   TRELEGY ELLIPTA 100-62.5-25 MCG/INH inhaler   Yes No   Si puff daily   acetaminophen (TYLENOL) 325 mg tablet   No No   Sig: Take 2 tablets (650 mg total) by mouth every 6 (six) hours as needed for mild pain   Patient not taking: Reported on 3/1/2021   aspirin 81 MG tablet   Yes No   Sig: Take 81 mg by mouth daily   clopidogrel (PLAVIX) 75 mg tablet   No No   Sig: Take 1 tablet (75 mg total) by mouth daily   colestipol (COLESTID) 1 g tablet   Yes No   Sig: Take 1 tablet by mouth 2 (two) times a day   ezetimibe (ZETIA) 10 mg tablet   Yes No   Sig: Take 10 mg by mouth daily   isosorbide dinitrate (ISORDIL) 30 mg tablet   Yes No   Sig: Take 30 mg by mouth daily   isosorbide mononitrate (IMDUR) 30 mg 24 hr tablet   Yes No   Sig: Take 30 mg by mouth daily   lisinopril (ZESTRIL) 2.5 mg tablet   Yes No   Sig: Take 2.5 mg by mouth every evening   lisinopril (ZESTRIL) 20 mg tablet   Yes No   Sig: Take 20 mg by mouth daily at bedtime   metoprolol succinate (TOPROL-XL) 100 mg 24 hr tablet   Yes No   Sig: Take 100 mg by mouth daily   metoprolol succinate (TOPROL-XL) 25 mg 24 hr tablet   Yes No   Sig: Take 25 mg by mouth daily   nitroglycerin (NITROSTAT) 0.4 mg SL tablet   Yes No   Sig: Place 0.4 mg under the tongue   pantoprazole (PROTONIX) 40 mg tablet   Yes No   Sig: Take 40 mg by mouth daily   traZODone (DESYREL) 100 mg tablet   No No   Sig: Take 1 tablet (100 mg total) by mouth daily at bedtime      Facility-Administered Medications: None       Past Medical History:   Diagnosis Date    Arthritis     COPD (chronic obstructive pulmonary disease) (HCC)     Diverticulitis     GERD (gastroesophageal reflux disease)     Heart attack ([address])     Hypertension     Lung disease     Prostate cancer ([address] [facility])     Pseudobulbar affect 2019    Sleep apnea     Stroke ([address] [facility])     TIA (transient ischemic attack)        Past Surgical History:   Procedure Laterality Date    CAROTID STENT      CHOLECYSTECTOMY      HERNIA REPAIR         Family History   Problem Relation Age of Onset    Stroke Mother     Atrial fibrillation Mother     No Known Problems Father     Diabetes Maternal Grandmother     Heart disease Maternal Grandmother     Heart disease Maternal Grandfather      I have reviewed and agree with the history as documented. E-Cigarette/Vaping    E-Cigarette Use Never User      E-Cigarette/Vaping Substances    Nicotine No     THC No     CBD No     Flavoring No     Other No     Unknown No      Social History     Tobacco Use    Smoking status: Former     Types: Cigarettes     Quit date: 2018     Years since quittin.5    Smokeless tobacco: Never   Vaping Use    Vaping Use: Never used   Substance Use Topics    Alcohol use: Not Currently     Comment: rarely    Drug use: Never       Review of Systems   Constitutional:  Negative for chills and fever. Respiratory:  Negative for shortness of breath. Cardiovascular:  Negative for chest pain. Gastrointestinal:  Positive for abdominal pain. Negative for abdominal distention, blood in stool, constipation, diarrhea, nausea and vomiting. Genitourinary:  Negative for dysuria, flank pain, hematuria and urgency. Musculoskeletal:  Negative for back pain.        Physical Exam  Physical Exam  Vitals and nursing note reviewed. Constitutional:       General: He is not in acute distress. Appearance: Normal appearance. He is not ill-appearing, toxic-appearing or diaphoretic. HENT:      Head: Normocephalic and atraumatic. Mouth/Throat:      Mouth: Mucous membranes are moist.   Eyes:      Conjunctiva/sclera: Conjunctivae normal.      Pupils: Pupils are equal, round, and reactive to light. Cardiovascular:      Rate and Rhythm: Normal rate and regular rhythm. Pulses: Normal pulses. Heart sounds: Normal heart sounds. No murmur heard. Pulmonary:      Effort: Pulmonary effort is normal. No respiratory distress. Breath sounds: Normal breath sounds. No stridor. No wheezing, rhonchi or rales. Chest:      Chest wall: No tenderness. Abdominal:      General: Bowel sounds are normal. There is no distension. Palpations: Abdomen is soft. Tenderness: There is abdominal tenderness in the left lower quadrant. There is no right CVA tenderness, left CVA tenderness, guarding or rebound. Negative signs include Holliday's sign, Rovsing's sign, McBurney's sign and psoas sign. Skin:     General: Skin is warm and dry. Neurological:      General: No focal deficit present. Mental Status: He is alert and oriented to person, place, and time. Mental status is at baseline.    Psychiatric:         Mood and Affect: Mood normal.         Behavior: Behavior normal.         Vital Signs  ED Triage Vitals   Temperature Pulse Respirations Blood Pressure SpO2   11/24/23 1503 11/24/23 1505 11/24/23 1505 11/24/23 1505 11/24/23 1505   97.7 °F (36.5 °C) 60 18 153/74 99 %      Temp Source Heart Rate Source Patient Position - Orthostatic VS BP Location FiO2 (%)   11/24/23 1503 11/24/23 1505 11/24/23 1505 11/24/23 1505 --   Temporal Monitor Sitting Left arm       Pain Score       11/24/23 1540       8           Vitals:    11/24/23 1505   BP: 153/74   Pulse: 60   Patient Position - Orthostatic VS: Sitting         Visual Acuity      ED Medications  Medications   acetaminophen (TYLENOL) tablet 975 mg (975 mg Oral Given 11/24/23 1540)   iohexol (OMNIPAQUE) 350 MG/ML injection (SINGLE-DOSE) 100 mL (100 mL Intravenous Given 11/24/23 1604)   morphine injection 4 mg (4 mg Intravenous Given 11/24/23 1636)       Diagnostic Studies  Results Reviewed       Procedure Component Value Units Date/Time    UA w Reflex to Microscopic w Reflex to Culture [[id number]]  (Abnormal) Collected: 11/24/23 1639    Lab Status: Final result Specimen: Urine, Clean Catch Updated: 11/24/23 1653     Color, UA Yellow     Clarity, UA Clear     Specific Gravity, UA <1.005     pH, UA 6.5     Leukocytes, UA Negative     Nitrite, UA Negative     Protein, UA Trace mg/dl      Glucose, UA Negative mg/dl      Ketones, UA Negative mg/dl      Urobilinogen, UA <2.0 mg/dl      Bilirubin, UA Negative     Occult Blood, UA Negative    Urine Microscopic [[id number]] Collected: 11/24/23 1639    Lab Status:  In process Specimen: Urine, Clean Catch Updated: 11/24/23 1649    Comprehensive metabolic panel [[id number]] Collected: 11/24/23 1509    Lab Status: Final result Specimen: Blood from Arm, Left Updated: 11/24/23 1556     Sodium 135 mmol/L      Potassium 4.2 mmol/L      Chloride 100 mmol/L      CO2 27 mmol/L      ANION GAP 8 mmol/L      BUN 13 mg/dL      Creatinine 1.17 mg/dL      Glucose 101 mg/dL      Calcium 9.6 mg/dL      AST 15 U/L      ALT 26 U/L      Alkaline Phosphatase 94 U/L      Total Protein 7.7 g/dL      Albumin 4.2 g/dL      Total Bilirubin 0.72 mg/dL      eGFR 64 ml/min/1.73sq m     Narrative:      [place] guidelines for Chronic Kidney Disease (CKD):     Stage 1 with normal or high GFR (GFR > 90 mL/min/1.73 square meters)    Stage 2 Mild CKD (GFR = 60-89 mL/min/1.73 square meters)    Stage 3A Moderate CKD (GFR = 45-59 mL/min/1.73 square meters)    Stage 3B Moderate CKD (GFR = 30-44 mL/min/1.73 square meters)    Stage 4 Severe CKD (GFR = 15-29 mL/min/1.73 square meters)    Stage 5 End Stage CKD (GFR <15 mL/min/1.73 square meters)  Note: GFR calculation is accurate only with a steady state creatinine    Lipase [[id number]]  (Normal) Collected: 11/24/23 1509    Lab Status: Final result Specimen: Blood from Arm, Left Updated: 11/24/23 1553     Lipase 27 u/L     CBC and differential [[id number]]  (Abnormal) Collected: 11/24/23 1509    Lab Status: Final result Specimen: Blood from Arm, Left Updated: 11/24/23 1515     WBC 12.84 Thousand/uL      RBC 5.16 Million/uL      Hemoglobin 15.2 g/dL      Hematocrit 46.4 %      MCV 90 fL      MCH 29.5 pg      MCHC 32.8 g/dL      RDW 13.4 %      MPV 9.0 fL      Platelets 027 Thousands/uL      nRBC 0 /100 WBCs      Neutrophils Relative 75 %      Immat GRANS % 1 %      Lymphocytes Relative 12 %      Monocytes Relative 10 %      Eosinophils Relative 1 %      Basophils Relative 1 %      Neutrophils Absolute 9.72 Thousands/µL      Immature Grans Absolute 0.12 Thousand/uL      Lymphocytes Absolute 1.53 Thousands/µL      Monocytes Absolute 1.25 Thousand/µL      Eosinophils Absolute 0.16 Thousand/µL      Basophils Absolute 0.06 Thousands/µL                    CT abdomen pelvis with contrast   Final Result by Arianna Maloney MD (11/24 1650)      1. Inflammatory changes adjacent to a thickened diverticulum off the proximal sigmoid colon in keeping with acute diverticulitis without complication. Followup colonoscopy after the acute illness has resolved is recommended to rule out colon cancer    mimicking diverticulitis. 2.  Multiple midline fat-containing supraumbilical hernias, present previously. 3.  Ectatic aorta measuring up to 2.9 cm.  According to ACR white paper for the management of incidentally detected abdominal vascular findings, for an aorta of this caliber (2.5-2.9 cm) follow-up imaging (e.g. Doppler ultrasound, CTA abdomen/pelvis) is    recommended at a 5 year interval. Reference:  J Am Cristobal Radiol 2501;25:059-419. The study was marked in [facility] for immediate notification. Workstation performed: [id number]                    Procedures  Procedures         ED Course  ED Course as of 11/24/23 1710   Fri Nov 24, 2023   1634 Patient continues complaining of pain. Will give a dose of morphine at this time. SBIRT 20yo+      Flowsheet Row Most Recent Value   Initial Alcohol Screen: US AUDIT-C     1. How often do you have a drink containing alcohol? 0 Filed at: 11/24/2023 1504   2. How many drinks containing alcohol do you have on a typical day you are drinking? 0 Filed at: 11/24/2023 1504   3a. Male UNDER 65: How often do you have five or more drinks on one occasion? 0 Filed at: 11/24/2023 1504   3b. FEMALE Any Age, or MALE 65+: How often do you have 4 or more drinks on one occassion? 0 Filed at: 11/24/2023 1504   Audit-C Score 0 Filed at: 11/24/2023 1504   STACEY: How many times in the past year have you. .. Used an illegal drug or used a prescription medication for non-medical reasons? Never Filed at: 11/24/2023 1504                      Medical Decision Making  Assessment and Plan:   Differential includes diverticulitis v. Colitis v. Kidney stone v. Uti/ pyelo. Labs to assess for leukocytosis, anemia, electrolyte abnormalities, kidney and liver function; UA to assess for UTI; CT AP to assess for the aforementioned differential. Treat with tylenol and reassess. Reviewed incidental findings of CT AP as well as diverticulitis. Prescribed antibiotics x 7 days, referral to GI and to vascular placed and encouraged PCP follow up as well. Reviewed strict RTED precautions which patient and wife at bedside verbalized understanding of. Amount and/or Complexity of Data Reviewed  Labs: ordered. Radiology: ordered. Risk  OTC drugs. Prescription drug management.              Disposition  Final diagnoses:   Diverticulitis   Ectatic aorta ([address])   Abdominal hernia     Time reflects when diagnosis was documented in both MDM as applicable and the Disposition within this note       Time User Action Codes Description Comment    11/24/2023  4:57 PM Sushant Esteban [K57.92] Diverticulitis     11/24/2023  5:07 PM Victor Manuel Garcia Add [I77.819] Ectatic aorta ([address])     11/24/2023  5:07 PM Victor Manuel Garcia Add [K46.9] Abdominal hernia           ED Disposition       ED Disposition   Discharge    Condition   Stable    Date/Time   Fri [facility] 2023 [facility] [place] [facility]self care.                    Follow-up Information       Follow up With Specialties Details Why Contact Info Additional Information    Wilder Elkins MD Emergency Medicine Schedule an appointment as soon as possible for a visit in 3 days for re-evaluation [address] [phone]        [facility] Emergency Department Emergency Medicine Go to  As needed, If symptoms worsen, for re-evaluation [address] [facility] [id number] [phone] [address] [facility] Emergency Department, [address] [facility], [address]            Patient's Medications   Discharge Prescriptions    AMOXICILLIN-CLAVULANATE (AUGMENTIN) 875-125 MG PER TABLET    Take 1 tablet by mouth every 12 (twelve) hours for 7 days       Start Date: 11/24/2023End Date: 12/1/2023       Order Dose: 1 tablet       Quantity: 14 tablet    Refills: 0           PDMP Review         Value Time User    PDMP Reviewed  Yes 2/14/2021  8:38 AM Marisa Orta PA-C            ED Provider  Electronically Signed by             Victor Manuel Garcia DO  11/24/23 [id number]

## 2023-11-24 NOTE — DISCHARGE INSTRUCTIONS
IMPRESSION:  1. Inflammatory changes adjacent to a thickened diverticulum off the proximal sigmoid colon in keeping with acute diverticulitis without complication. Followup colonoscopy after the acute illness has resolved is recommended to rule out colon cancer   mimicking diverticulitis. 2.  Multiple midline fat-containing supraumbilical hernias, present previously. 3.  Ectatic aorta measuring up to 2.9 cm. According to ACR white paper for the management of incidentally detected abdominal vascular findings, for an aorta of this caliber (2.5-2.9 cm) follow-up imaging (e.g. Doppler ultrasound, CTA abdomen/pelvis) is   recommended at a 5 year interval. Reference:  J Am Cristobal Radiol 2013;10:789-794.

## 2025-04-02 ENCOUNTER — TELEPHONE (OUTPATIENT)
Age: 70
End: 2025-04-02

## 2025-04-02 NOTE — TELEPHONE ENCOUNTER
Patient's [employer]  called to confirm patient  last day if service and also get our fax number for records. Provided information .